# Patient Record
Sex: MALE | Race: WHITE | NOT HISPANIC OR LATINO | Employment: OTHER | URBAN - METROPOLITAN AREA
[De-identification: names, ages, dates, MRNs, and addresses within clinical notes are randomized per-mention and may not be internally consistent; named-entity substitution may affect disease eponyms.]

---

## 2020-02-28 ENCOUNTER — APPOINTMENT (OUTPATIENT)
Dept: RADIOLOGY | Facility: MEDICAL CENTER | Age: 85
DRG: 440 | End: 2020-02-28
Attending: EMERGENCY MEDICINE
Payer: MEDICARE

## 2020-02-28 ENCOUNTER — HOSPITAL ENCOUNTER (INPATIENT)
Facility: MEDICAL CENTER | Age: 85
LOS: 6 days | DRG: 440 | End: 2020-03-05
Attending: EMERGENCY MEDICINE | Admitting: HOSPITALIST
Payer: MEDICARE

## 2020-02-28 DIAGNOSIS — R10.84 GENERALIZED ABDOMINAL PAIN: ICD-10-CM

## 2020-02-28 DIAGNOSIS — R53.1 WEAKNESS: ICD-10-CM

## 2020-02-28 DIAGNOSIS — R09.02 HYPOXIA: ICD-10-CM

## 2020-02-28 DIAGNOSIS — K85.90 ACUTE PANCREATITIS, UNSPECIFIED COMPLICATION STATUS, UNSPECIFIED PANCREATITIS TYPE: ICD-10-CM

## 2020-02-28 DIAGNOSIS — K85.90 ACUTE PANCREATITIS WITHOUT INFECTION OR NECROSIS, UNSPECIFIED PANCREATITIS TYPE: ICD-10-CM

## 2020-02-28 PROBLEM — I15.9 SECONDARY HYPERTENSION: Status: ACTIVE | Noted: 2020-02-28

## 2020-02-28 PROBLEM — N28.89 RIGHT RENAL MASS: Status: ACTIVE | Noted: 2020-02-28

## 2020-02-28 PROBLEM — R73.9 HYPERGLYCEMIA: Status: ACTIVE | Noted: 2020-02-28

## 2020-02-28 PROBLEM — N20.0 NEPHROLITHIASIS: Status: ACTIVE | Noted: 2020-02-28

## 2020-02-28 LAB
ALBUMIN SERPL BCP-MCNC: 4.5 G/DL (ref 3.2–4.9)
ALBUMIN/GLOB SERPL: 1.4 G/DL
ALP SERPL-CCNC: 68 U/L (ref 30–99)
ALT SERPL-CCNC: 39 U/L (ref 2–50)
ANION GAP SERPL CALC-SCNC: 15 MMOL/L (ref 0–11.9)
AST SERPL-CCNC: 33 U/L (ref 12–45)
BASOPHILS # BLD AUTO: 0.4 % (ref 0–1.8)
BASOPHILS # BLD: 0.04 K/UL (ref 0–0.12)
BILIRUB SERPL-MCNC: 1.1 MG/DL (ref 0.1–1.5)
BUN SERPL-MCNC: 17 MG/DL (ref 8–22)
CALCIUM SERPL-MCNC: 10 MG/DL (ref 8.5–10.5)
CHLORIDE SERPL-SCNC: 104 MMOL/L (ref 96–112)
CO2 SERPL-SCNC: 19 MMOL/L (ref 20–33)
CREAT SERPL-MCNC: 1.05 MG/DL (ref 0.5–1.4)
EKG IMPRESSION: NORMAL
EOSINOPHIL # BLD AUTO: 0 K/UL (ref 0–0.51)
EOSINOPHIL NFR BLD: 0 % (ref 0–6.9)
ERYTHROCYTE [DISTWIDTH] IN BLOOD BY AUTOMATED COUNT: 47 FL (ref 35.9–50)
GLOBULIN SER CALC-MCNC: 3.3 G/DL (ref 1.9–3.5)
GLUCOSE SERPL-MCNC: 220 MG/DL (ref 65–99)
HCT VFR BLD AUTO: 50 % (ref 42–52)
HGB BLD-MCNC: 17.5 G/DL (ref 14–18)
IMM GRANULOCYTES # BLD AUTO: 0.06 K/UL (ref 0–0.11)
IMM GRANULOCYTES NFR BLD AUTO: 0.6 % (ref 0–0.9)
LACTATE BLD-SCNC: 2 MMOL/L (ref 0.5–2)
LIPASE SERPL-CCNC: 1991 U/L (ref 11–82)
LYMPHOCYTES # BLD AUTO: 0.58 K/UL (ref 1–4.8)
LYMPHOCYTES NFR BLD: 5.6 % (ref 22–41)
MCH RBC QN AUTO: 33.1 PG (ref 27–33)
MCHC RBC AUTO-ENTMCNC: 35 G/DL (ref 33.7–35.3)
MCV RBC AUTO: 94.5 FL (ref 81.4–97.8)
MONOCYTES # BLD AUTO: 0.34 K/UL (ref 0–0.85)
MONOCYTES NFR BLD AUTO: 3.3 % (ref 0–13.4)
NEUTROPHILS # BLD AUTO: 9.43 K/UL (ref 1.82–7.42)
NEUTROPHILS NFR BLD: 90.1 % (ref 44–72)
NRBC # BLD AUTO: 0 K/UL
NRBC BLD-RTO: 0 /100 WBC
PLATELET # BLD AUTO: 180 K/UL (ref 164–446)
PMV BLD AUTO: 9.9 FL (ref 9–12.9)
POTASSIUM SERPL-SCNC: 4 MMOL/L (ref 3.6–5.5)
PROT SERPL-MCNC: 7.8 G/DL (ref 6–8.2)
RBC # BLD AUTO: 5.29 M/UL (ref 4.7–6.1)
SODIUM SERPL-SCNC: 138 MMOL/L (ref 135–145)
TRIGL SERPL-MCNC: 71 MG/DL (ref 0–149)
WBC # BLD AUTO: 10.5 K/UL (ref 4.8–10.8)

## 2020-02-28 PROCEDURE — 83690 ASSAY OF LIPASE: CPT

## 2020-02-28 PROCEDURE — 83036 HEMOGLOBIN GLYCOSYLATED A1C: CPT

## 2020-02-28 PROCEDURE — 99285 EMERGENCY DEPT VISIT HI MDM: CPT

## 2020-02-28 PROCEDURE — 700105 HCHG RX REV CODE 258: Performed by: EMERGENCY MEDICINE

## 2020-02-28 PROCEDURE — 700117 HCHG RX CONTRAST REV CODE 255: Performed by: EMERGENCY MEDICINE

## 2020-02-28 PROCEDURE — 84478 ASSAY OF TRIGLYCERIDES: CPT

## 2020-02-28 PROCEDURE — 74177 CT ABD & PELVIS W/CONTRAST: CPT

## 2020-02-28 PROCEDURE — 700111 HCHG RX REV CODE 636 W/ 250 OVERRIDE (IP): Performed by: HOSPITALIST

## 2020-02-28 PROCEDURE — 700102 HCHG RX REV CODE 250 W/ 637 OVERRIDE(OP): Performed by: HOSPITALIST

## 2020-02-28 PROCEDURE — 96376 TX/PRO/DX INJ SAME DRUG ADON: CPT

## 2020-02-28 PROCEDURE — 93005 ELECTROCARDIOGRAM TRACING: CPT | Performed by: EMERGENCY MEDICINE

## 2020-02-28 PROCEDURE — 85025 COMPLETE CBC W/AUTO DIFF WBC: CPT

## 2020-02-28 PROCEDURE — 96374 THER/PROPH/DIAG INJ IV PUSH: CPT

## 2020-02-28 PROCEDURE — 770006 HCHG ROOM/CARE - MED/SURG/GYN SEMI*

## 2020-02-28 PROCEDURE — 71045 X-RAY EXAM CHEST 1 VIEW: CPT

## 2020-02-28 PROCEDURE — 93005 ELECTROCARDIOGRAM TRACING: CPT

## 2020-02-28 PROCEDURE — A9270 NON-COVERED ITEM OR SERVICE: HCPCS | Performed by: HOSPITALIST

## 2020-02-28 PROCEDURE — 96375 TX/PRO/DX INJ NEW DRUG ADDON: CPT

## 2020-02-28 PROCEDURE — 700105 HCHG RX REV CODE 258: Performed by: HOSPITALIST

## 2020-02-28 PROCEDURE — 99223 1ST HOSP IP/OBS HIGH 75: CPT | Performed by: HOSPITALIST

## 2020-02-28 PROCEDURE — 700111 HCHG RX REV CODE 636 W/ 250 OVERRIDE (IP): Performed by: EMERGENCY MEDICINE

## 2020-02-28 PROCEDURE — 80053 COMPREHEN METABOLIC PANEL: CPT

## 2020-02-28 PROCEDURE — 83605 ASSAY OF LACTIC ACID: CPT

## 2020-02-28 RX ORDER — ONDANSETRON 2 MG/ML
4 INJECTION INTRAMUSCULAR; INTRAVENOUS ONCE
Status: COMPLETED | OUTPATIENT
Start: 2020-02-28 | End: 2020-02-28

## 2020-02-28 RX ORDER — MORPHINE SULFATE 4 MG/ML
4 INJECTION, SOLUTION INTRAMUSCULAR; INTRAVENOUS ONCE
Status: COMPLETED | OUTPATIENT
Start: 2020-02-28 | End: 2020-02-28

## 2020-02-28 RX ORDER — HYDROMORPHONE HYDROCHLORIDE 1 MG/ML
1 INJECTION, SOLUTION INTRAMUSCULAR; INTRAVENOUS; SUBCUTANEOUS
Status: DISCONTINUED | OUTPATIENT
Start: 2020-02-28 | End: 2020-02-29

## 2020-02-28 RX ORDER — ATORVASTATIN CALCIUM 10 MG/1
10 TABLET, FILM COATED ORAL NIGHTLY
COMMUNITY

## 2020-02-28 RX ORDER — OXYCODONE HYDROCHLORIDE 5 MG/1
5 TABLET ORAL
Status: DISCONTINUED | OUTPATIENT
Start: 2020-02-28 | End: 2020-02-28

## 2020-02-28 RX ORDER — OXYCODONE HYDROCHLORIDE 10 MG/1
10 TABLET ORAL
Status: DISCONTINUED | OUTPATIENT
Start: 2020-02-28 | End: 2020-02-29

## 2020-02-28 RX ORDER — HYDRALAZINE HYDROCHLORIDE 20 MG/ML
10 INJECTION INTRAMUSCULAR; INTRAVENOUS EVERY 4 HOURS PRN
Status: DISCONTINUED | OUTPATIENT
Start: 2020-02-28 | End: 2020-03-05 | Stop reason: HOSPADM

## 2020-02-28 RX ORDER — POLYETHYLENE GLYCOL 3350 17 G/17G
1 POWDER, FOR SOLUTION ORAL
Status: DISCONTINUED | OUTPATIENT
Start: 2020-02-28 | End: 2020-03-05 | Stop reason: HOSPADM

## 2020-02-28 RX ORDER — LATANOPROST 50 UG/ML
1 SOLUTION/ DROPS OPHTHALMIC
Status: DISCONTINUED | OUTPATIENT
Start: 2020-02-28 | End: 2020-03-05 | Stop reason: HOSPADM

## 2020-02-28 RX ORDER — OXYCODONE HYDROCHLORIDE 10 MG/1
10 TABLET ORAL
Status: DISCONTINUED | OUTPATIENT
Start: 2020-02-28 | End: 2020-02-28

## 2020-02-28 RX ORDER — SODIUM CHLORIDE, SODIUM LACTATE, POTASSIUM CHLORIDE, CALCIUM CHLORIDE 600; 310; 30; 20 MG/100ML; MG/100ML; MG/100ML; MG/100ML
INJECTION, SOLUTION INTRAVENOUS CONTINUOUS
Status: DISCONTINUED | OUTPATIENT
Start: 2020-02-28 | End: 2020-03-04

## 2020-02-28 RX ORDER — HYDROMORPHONE HYDROCHLORIDE 1 MG/ML
0.5 INJECTION, SOLUTION INTRAMUSCULAR; INTRAVENOUS; SUBCUTANEOUS ONCE
Status: COMPLETED | OUTPATIENT
Start: 2020-02-28 | End: 2020-02-28

## 2020-02-28 RX ORDER — HYDROMORPHONE HYDROCHLORIDE 1 MG/ML
0.5 INJECTION, SOLUTION INTRAMUSCULAR; INTRAVENOUS; SUBCUTANEOUS
Status: DISCONTINUED | OUTPATIENT
Start: 2020-02-28 | End: 2020-02-28

## 2020-02-28 RX ORDER — ENALAPRILAT 1.25 MG/ML
1.25 INJECTION INTRAVENOUS EVERY 6 HOURS PRN
Status: DISCONTINUED | OUTPATIENT
Start: 2020-02-28 | End: 2020-03-05 | Stop reason: HOSPADM

## 2020-02-28 RX ORDER — OXYCODONE HYDROCHLORIDE 5 MG/1
5 TABLET ORAL
Status: DISCONTINUED | OUTPATIENT
Start: 2020-02-28 | End: 2020-02-29

## 2020-02-28 RX ORDER — ENALAPRILAT 1.25 MG/ML
1.25 INJECTION INTRAVENOUS EVERY 6 HOURS PRN
Status: DISCONTINUED | OUTPATIENT
Start: 2020-02-28 | End: 2020-02-28

## 2020-02-28 RX ORDER — SODIUM CHLORIDE 9 MG/ML
1000 INJECTION, SOLUTION INTRAVENOUS ONCE
Status: COMPLETED | OUTPATIENT
Start: 2020-02-28 | End: 2020-02-28

## 2020-02-28 RX ORDER — BISACODYL 10 MG
10 SUPPOSITORY, RECTAL RECTAL
Status: DISCONTINUED | OUTPATIENT
Start: 2020-02-28 | End: 2020-03-05 | Stop reason: HOSPADM

## 2020-02-28 RX ORDER — PAROXETINE 10 MG/1
10 TABLET, FILM COATED ORAL DAILY
COMMUNITY

## 2020-02-28 RX ORDER — AMOXICILLIN 250 MG
2 CAPSULE ORAL 2 TIMES DAILY
Status: DISCONTINUED | OUTPATIENT
Start: 2020-02-28 | End: 2020-03-05 | Stop reason: HOSPADM

## 2020-02-28 RX ADMIN — IOHEXOL 100 ML: 350 INJECTION, SOLUTION INTRAVENOUS at 10:59

## 2020-02-28 RX ADMIN — MORPHINE SULFATE 4 MG: 4 INJECTION INTRAVENOUS at 09:55

## 2020-02-28 RX ADMIN — ONDANSETRON 4 MG: 2 INJECTION INTRAMUSCULAR; INTRAVENOUS at 11:48

## 2020-02-28 RX ADMIN — HYDRALAZINE HYDROCHLORIDE 10 MG: 20 INJECTION INTRAMUSCULAR; INTRAVENOUS at 17:01

## 2020-02-28 RX ADMIN — OXYCODONE HYDROCHLORIDE 10 MG: 10 TABLET ORAL at 16:10

## 2020-02-28 RX ADMIN — SODIUM CHLORIDE 1000 ML: 9 INJECTION, SOLUTION INTRAVENOUS at 09:55

## 2020-02-28 RX ADMIN — HYDROMORPHONE HYDROCHLORIDE 1 MG: 1 INJECTION, SOLUTION INTRAMUSCULAR; INTRAVENOUS; SUBCUTANEOUS at 19:46

## 2020-02-28 RX ADMIN — SODIUM CHLORIDE, POTASSIUM CHLORIDE, SODIUM LACTATE AND CALCIUM CHLORIDE: 600; 310; 30; 20 INJECTION, SOLUTION INTRAVENOUS at 14:58

## 2020-02-28 RX ADMIN — HYDROMORPHONE HYDROCHLORIDE 1 MG: 1 INJECTION, SOLUTION INTRAMUSCULAR; INTRAVENOUS; SUBCUTANEOUS at 22:42

## 2020-02-28 RX ADMIN — SODIUM CHLORIDE, POTASSIUM CHLORIDE, SODIUM LACTATE AND CALCIUM CHLORIDE: 600; 310; 30; 20 INJECTION, SOLUTION INTRAVENOUS at 21:45

## 2020-02-28 RX ADMIN — HYDROMORPHONE HYDROCHLORIDE 0.5 MG: 1 INJECTION, SOLUTION INTRAMUSCULAR; INTRAVENOUS; SUBCUTANEOUS at 14:27

## 2020-02-28 RX ADMIN — LATANOPROST 1 DROP: 50 SOLUTION OPHTHALMIC at 21:40

## 2020-02-28 RX ADMIN — HYDROMORPHONE HYDROCHLORIDE 0.5 MG: 1 INJECTION, SOLUTION INTRAMUSCULAR; INTRAVENOUS; SUBCUTANEOUS at 11:35

## 2020-02-28 RX ADMIN — ONDANSETRON 4 MG: 2 INJECTION INTRAMUSCULAR; INTRAVENOUS at 09:55

## 2020-02-28 RX ADMIN — HYDROMORPHONE HYDROCHLORIDE 1 MG: 1 INJECTION, SOLUTION INTRAMUSCULAR; INTRAVENOUS; SUBCUTANEOUS at 17:01

## 2020-02-28 RX ADMIN — ENALAPRILAT 1.25 MG: 1.25 INJECTION INTRAVENOUS at 16:10

## 2020-02-28 ASSESSMENT — ENCOUNTER SYMPTOMS
FEVER: 0
SHORTNESS OF BREATH: 0
NERVOUS/ANXIOUS: 0
DEPRESSION: 0
ABDOMINAL PAIN: 1
NAUSEA: 1
CONSTIPATION: 0
DIZZINESS: 0
BLOOD IN STOOL: 0
STRIDOR: 0
VOMITING: 1
DIARRHEA: 0
COUGH: 0
PALPITATIONS: 0
CHILLS: 0
EYE DISCHARGE: 0
WEIGHT LOSS: 0
BACK PAIN: 0
HEADACHES: 0

## 2020-02-28 ASSESSMENT — LIFESTYLE VARIABLES
CONSUMPTION TOTAL: NEGATIVE
EVER FELT BAD OR GUILTY ABOUT YOUR DRINKING: NO
HOW MANY TIMES IN THE PAST YEAR HAVE YOU HAD 5 OR MORE DRINKS IN A DAY: 0
ON A TYPICAL DAY WHEN YOU DRINK ALCOHOL HOW MANY DRINKS DO YOU HAVE: 1
EVER HAD A DRINK FIRST THING IN THE MORNING TO STEADY YOUR NERVES TO GET RID OF A HANGOVER: NO
AVERAGE NUMBER OF DAYS PER WEEK YOU HAVE A DRINK CONTAINING ALCOHOL: 5
TOTAL SCORE: 0
EVER_SMOKED: YES
HAVE PEOPLE ANNOYED YOU BY CRITICIZING YOUR DRINKING: NO
TOTAL SCORE: 0
TOTAL SCORE: 0
DOES PATIENT WANT TO STOP DRINKING: NO
ALCOHOL_USE: YES
HAVE YOU EVER FELT YOU SHOULD CUT DOWN ON YOUR DRINKING: NO

## 2020-02-28 ASSESSMENT — COGNITIVE AND FUNCTIONAL STATUS - GENERAL
MOVING FROM LYING ON BACK TO SITTING ON SIDE OF FLAT BED: A LITTLE
DAILY ACTIVITIY SCORE: 18
DRESSING REGULAR LOWER BODY CLOTHING: A LITTLE
MOBILITY SCORE: 18
CLIMB 3 TO 5 STEPS WITH RAILING: A LITTLE
HELP NEEDED FOR BATHING: A LITTLE
SUGGESTED CMS G CODE MODIFIER MOBILITY: CK
TURNING FROM BACK TO SIDE WHILE IN FLAT BAD: A LITTLE
PERSONAL GROOMING: A LITTLE
MOVING TO AND FROM BED TO CHAIR: A LITTLE
DRESSING REGULAR UPPER BODY CLOTHING: A LITTLE
TOILETING: A LITTLE
EATING MEALS: A LITTLE
SUGGESTED CMS G CODE MODIFIER DAILY ACTIVITY: CK
WALKING IN HOSPITAL ROOM: A LITTLE
STANDING UP FROM CHAIR USING ARMS: A LITTLE

## 2020-02-28 ASSESSMENT — PATIENT HEALTH QUESTIONNAIRE - PHQ9
1. LITTLE INTEREST OR PLEASURE IN DOING THINGS: NOT AT ALL
2. FEELING DOWN, DEPRESSED, IRRITABLE, OR HOPELESS: NOT AT ALL
SUM OF ALL RESPONSES TO PHQ9 QUESTIONS 1 AND 2: 0

## 2020-02-28 NOTE — ED TRIAGE NOTES
Pt  to triage with   Chief Complaint   Patient presents with   • Abdominal Pain     started at 1 am, started epigastric and now spreading through abd, N/V reported, sharp pain, sudden onset.     Pt Informed regarding triage process and verbalized understanding to inform triage tech or RN for any changes in condition. Placed in lobby.

## 2020-02-28 NOTE — H&P
Hospital Medicine History & Physical Note    Date of Service  2020    Primary Care Physician  Rosemary Mckeon M.D.    Consultants  none    Code Status  FULL    Chief Complaint  Abdominal pain at 1 am.    History of Presenting Illness  85 y.o. male who presented 2020 with acute abdominal pain that initiated at 0100.  He had last had a meal at 1900 yesterday and no food since that time.  He has had acute pancreatitis 3 years ago which may have been due to drinking too many beers at that time.  He had complications during his hospitalization and since that time has not been drinking any alcohol.  He denies any new medications, recent viral infections, nor alcohol.  He does not have a history of hypertriglyceridemia.      Review of Systems  Review of Systems   Constitutional: Negative for chills, fever and weight loss.   Eyes: Negative for discharge.   Respiratory: Negative for cough, shortness of breath and stridor.    Cardiovascular: Negative for chest pain, palpitations and leg swelling.   Gastrointestinal: Positive for abdominal pain, nausea and vomiting. Negative for blood in stool, constipation, diarrhea and melena.   Genitourinary: Negative for dysuria and hematuria.   Musculoskeletal: Negative for back pain and joint pain.   Skin: Negative for rash.   Neurological: Negative for dizziness and headaches.   Psychiatric/Behavioral: Negative for depression. The patient is not nervous/anxious.        Past Medical History   has a past medical history of Arthritis, CATARACT, Hypertension, and Pain.    Surgical History   has a past surgical history that includes myringotomy; tonsillectomy and adenoidectomy; hemorrhoidectomy; lumbar laminectomy diskectomy (7/15/2010); cataract phaco with iol (3/2/2011); cataract phaco with iol (3/16/2011); and inj,epidural/lumb/sac single (2013).     Family History  Father  in his early 90s with brain cancer  Mother  at age 101 from heart attack      Social History    reports that he has quit smoking. He has never used smokeless tobacco. He reports that he does not use drugs.  Patient is a retired anesthesiologist.  He has 3 children.  He is .  Denies any current alcohol use    Allergies  Allergies   Allergen Reactions   • Hydrodiuril [Hctz] Itching   • Lidocaine    • Sulfa Drugs        Medications  Prior to Admission Medications   Prescriptions Last Dose Informant Patient Reported? Taking?   Bimatoprost (LUMIGAN) 0.01 % SOLN 2020 at PM Patient Yes No   Si Drop by Ophthalmic route every bedtime. To both eyes    PARoxetine (PAXIL) 10 MG Tab 2020 at PM Patient Yes Yes   Sig: Take 10 mg by mouth every day.   atorvastatin (LIPITOR) 10 MG Tab 2020 at PM Patient Yes Yes   Sig: Take 10 mg by mouth every evening.      Facility-Administered Medications: None       Physical Exam  Temp:  [36.4 °C (97.5 °F)-37.1 °C (98.8 °F)] 37.1 °C (98.8 °F)  Pulse:  [] 102  Resp:  [18-22] 18  BP: (169-187)/() 172/83  SpO2:  [92 %-95 %] 93 %    Physical Exam  Vitals signs reviewed.   Constitutional:       Appearance: Normal appearance. He is not diaphoretic.   HENT:      Head: Normocephalic and atraumatic.      Nose: Nose normal.      Mouth/Throat:      Mouth: Mucous membranes are moist.      Pharynx: No oropharyngeal exudate.   Eyes:      General: No scleral icterus.        Right eye: No discharge.         Left eye: No discharge.      Extraocular Movements: Extraocular movements intact.      Conjunctiva/sclera: Conjunctivae normal.      Pupils: Pupils are equal, round, and reactive to light.   Neck:      Musculoskeletal: No muscular tenderness.      Vascular: No carotid bruit.   Cardiovascular:      Rate and Rhythm: Normal rate and regular rhythm.      Pulses:           Radial pulses are 2+ on the right side and 2+ on the left side.        Dorsalis pedis pulses are 2+ on the right side and 2+ on the left side.      Heart sounds: Normal heart sounds. No murmur.    Pulmonary:      Effort: Pulmonary effort is normal. No respiratory distress.      Breath sounds: Normal breath sounds. No wheezing or rales.   Abdominal:      General: Bowel sounds are decreased. There is no distension.      Palpations: Abdomen is soft.      Tenderness: There is abdominal tenderness.   Musculoskeletal:         General: No swelling or tenderness.   Lymphadenopathy:      Cervical: No cervical adenopathy.   Skin:     Coloration: Skin is not jaundiced or pale.   Neurological:      General: No focal deficit present.      Mental Status: He is alert and oriented to person, place, and time. Mental status is at baseline.      Cranial Nerves: No cranial nerve deficit.   Psychiatric:         Mood and Affect: Mood normal.         Behavior: Behavior normal.         Laboratory:  Recent Labs     02/28/20  0924   WBC 10.5   RBC 5.29   HEMOGLOBIN 17.5   HEMATOCRIT 50.0   MCV 94.5   MCH 33.1*   MCHC 35.0   RDW 47.0   PLATELETCT 180   MPV 9.9     Recent Labs     02/28/20  0924   SODIUM 138   POTASSIUM 4.0   CHLORIDE 104   CO2 19*   GLUCOSE 220*   BUN 17   CREATININE 1.05   CALCIUM 10.0     Recent Labs     02/28/20  0924   ALTSGPT 39   ASTSGOT 33   ALKPHOSPHAT 68   TBILIRUBIN 1.1   LIPASE 1991*   GLUCOSE 220*         No results for input(s): NTPROBNP in the last 72 hours.      No results for input(s): TROPONINT in the last 72 hours.    Urinalysis:    No results found     Imaging:  CT-ABDOMEN-PELVIS WITH   Final Result      Significant inflammatory changes about the pancreatic head and body. Findings compatible with pancreatitis. Inflammation extends above the duodenum as well.      Pancreatic calcifications likely sequelae of chronic pancreatitis.      Atherosclerotic plaque.      Indeterminate 1.1 cm right renal lesion may represent a hemorrhagic/proteinaceous cyst but a solid lesion is not excluded. Further evaluation can be performed with renal protocol MRI.      Small hypodense renal lesions bilaterally are too  small to characterize.      Bilateral nonobstructing renal calculi.               DX-CHEST-PORTABLE (1 VIEW)   Final Result      Bibasilar opacities may represent atelectasis or pneumonitis.      Atherosclerotic plaque.            Assessment/Plan:  I anticipate this patient will require at least two midnights for appropriate medical management, necessitating inpatient admission.    * Acute pancreatitis  Assessment & Plan  Aggressive IV fluids 150 cc an hour  Follow-up CBC in the morning  Dilaudid 1 mg every hour IV as well as Roxicodone as needed  N.p.o. for now  CT shows chronic pancreatitis findings.  Patient has been alcohol free x3 years but has never been a big drinker.  No recent illnesses to suggest viral etiology.  He has not had any new medications.    Secondary hypertension  Assessment & Plan  Monitor vitals  Enalapril 1.25 mg IV every 6 hours and hydralazine 10 mg IV every 4 hours as needed  Control pain    Right renal mass  Assessment & Plan  I have recommended the patient follow-up with his urologist Dr. Hiren Evans  I have alerted the patient and his wife the CT findings showing Indeterminate 1.1 cm right renal lesion may represent a hemorrhagic/proteinaceous cyst but a solid lesion is not excluded. Further evaluation can be performed with renal protocol MRI.  Small hypodense renal lesions bilaterally are too small to characterizeIndeterminate     Hyperglycemia  Assessment & Plan  Check a Glycohemoglobin  Monitor labs.    Nephrolithiasis  Assessment & Plan  Noted on CT abdomen/pelvis nonobstructive.  IV fluids      VTE prophylaxis: SCDs

## 2020-02-28 NOTE — ED PROVIDER NOTES
ED Provider Note    CHIEF COMPLAINT  Chief Complaint   Patient presents with   • Abdominal Pain     started at 1 am, started epigastric and now spreading through abd, N/V reported, sharp pain, sudden onset.       HPI  Mary Castillo is a 85 y.o. male who presents to the emergency department complaining of abdominal pain.  Patient had abrupt onset of abdominal pain about 1 AM.  Primarily epigastric area does not radiate.  Pain is moderate to severe.  Nothing makes it better nothing makes it worse.  Associated nausea and vomiting.  Pain is sharp and severe.  No tearing.  To the back.  No chest pain or shortness of breath.  Denies any other aggravating leaving factors associated complaints.  Nematodes melena hematochezia.  He has previous history of pancreatitis feels somewhat similar.  No history of peptic ulcer disease.    REVIEW OF SYSTEMS  See HPI for further details. All other systems are negative.    PAST MEDICAL HISTORY  Past Medical History:   Diagnosis Date   • Arthritis     spinal stenosis   • CATARACT    • Hypertension    • Pain        FAMILY HISTORY  History reviewed. No pertinent family history.    SOCIAL HISTORY  Social History     Socioeconomic History   • Marital status:      Spouse name: Not on file   • Number of children: Not on file   • Years of education: Not on file   • Highest education level: Not on file   Occupational History   • Not on file   Social Needs   • Financial resource strain: Not on file   • Food insecurity     Worry: Not on file     Inability: Not on file   • Transportation needs     Medical: Not on file     Non-medical: Not on file   Tobacco Use   • Smoking status: Former Smoker   • Smokeless tobacco: Never Used   • Tobacco comment: 2 ppd, 2 yrs   Substance and Sexual Activity   • Alcohol use: Yes     Comment: 2 per day   • Drug use: No   • Sexual activity: Not on file   Lifestyle   • Physical activity     Days per week: Not on file     Minutes per session: Not on file  "  • Stress: Not on file   Relationships   • Social connections     Talks on phone: Not on file     Gets together: Not on file     Attends Jewish service: Not on file     Active member of club or organization: Not on file     Attends meetings of clubs or organizations: Not on file     Relationship status: Not on file   • Intimate partner violence     Fear of current or ex partner: Not on file     Emotionally abused: Not on file     Physically abused: Not on file     Forced sexual activity: Not on file   Other Topics Concern   • Not on file   Social History Narrative   • Not on file       SURGICAL HISTORY  Past Surgical History:   Procedure Laterality Date   • INJ,EPIDURAL/LUMB/SAC SINGLE  2/1/2013    Performed by Marcial Caballero D.O. at [facility]   • CATARACT PHACO WITH IOL  3/16/2011    Performed by ARI FORTE at [facility]   • CATARACT PHACO WITH IOL  3/2/2011    Performed by ARI FORTE at [facility]   • LUMBAR LAMINECTOMY DISKECTOMY  7/15/2010    Performed by MARIE WYNN at [facility]   • HEMORRHOIDECTOMY     • MYRINGOTOMY     • TONSILLECTOMY AND ADENOIDECTOMY         CURRENT MEDICATIONS  Home Medications     Reviewed by Aga Cage R.N. (Registered Nurse) on 02/28/20 at 0851  Med List Status: Partial   Medication Last Dose Status   Bimatoprost (LUMIGAN) 0.01 % SOLN  Active   Naproxen Sodium (ALEVE) 220 MG CAPS  Active   telmisartan (MICARDIS) 80 MG TABS  Active                ALLERGIES  Allergies   Allergen Reactions   • Hydrodiuril [Hctz] Itching   • Lidocaine    • Sulfa Drugs        PHYSICAL EXAM  VITAL SIGNS: BP (!) 180/99   Pulse 72   Temp 36.4 °C (97.5 °F) (Oral)   Resp (!) 22   Ht 1.803 m (5' 11\")   Wt 90.7 kg (200 lb)   SpO2 94%   BMI 27.89 kg/m²    Constitutional: Awake alert appears uncomfortable no acute cardiopulmonary stress.  HENT: Normocephalic, Atraumatic, Bilateral external ears normal, " Oropharynx moist, No oral exudates, Nose normal.   Eyes: PERRL, EOMI, Conjunctiva normal, No discharge.   Neck: Normal range of motion, No tenderness, Supple, No stridor.    Cardiovascular: Normal heart rate, Normal rhythm, No murmurs, No rubs, No gallops.   Thorax & Lungs: Normal breath sounds, No respiratory distress, No wheezing, No chest tenderness.   Abdomen: Distended, firm, diffuse tenderness concern for peritonitis.  Hypoactive bowel sounds.  Skin: Warm, Dry, No erythema, No rash.   Musculoskeletal: Good range of motion in all major joints.   Neurologic: Alert, No focal deficits noted.   Psychiatric: Affect normal    Results for orders placed or performed during the hospital encounter of 02/28/20   CBC WITH DIFFERENTIAL   Result Value Ref Range    WBC 10.5 4.8 - 10.8 K/uL    RBC 5.29 4.70 - 6.10 M/uL    Hemoglobin 17.5 14.0 - 18.0 g/dL    Hematocrit 50.0 42.0 - 52.0 %    MCV 94.5 81.4 - 97.8 fL    MCH 33.1 (H) 27.0 - 33.0 pg    MCHC 35.0 33.7 - 35.3 g/dL    RDW 47.0 35.9 - 50.0 fL    Platelet Count 180 164 - 446 K/uL    MPV 9.9 9.0 - 12.9 fL    Neutrophils-Polys 90.10 (H) 44.00 - 72.00 %    Lymphocytes 5.60 (L) 22.00 - 41.00 %    Monocytes 3.30 0.00 - 13.40 %    Eosinophils 0.00 0.00 - 6.90 %    Basophils 0.40 0.00 - 1.80 %    Immature Granulocytes 0.60 0.00 - 0.90 %    Nucleated RBC 0.00 /100 WBC    Neutrophils (Absolute) 9.43 (H) 1.82 - 7.42 K/uL    Lymphs (Absolute) 0.58 (L) 1.00 - 4.80 K/uL    Monos (Absolute) 0.34 0.00 - 0.85 K/uL    Eos (Absolute) 0.00 0.00 - 0.51 K/uL    Baso (Absolute) 0.04 0.00 - 0.12 K/uL    Immature Granulocytes (abs) 0.06 0.00 - 0.11 K/uL    NRBC (Absolute) 0.00 K/uL   COMP METABOLIC PANEL   Result Value Ref Range    Sodium 138 135 - 145 mmol/L    Potassium 4.0 3.6 - 5.5 mmol/L    Chloride 104 96 - 112 mmol/L    Co2 19 (L) 20 - 33 mmol/L    Anion Gap 15.0 (H) 0.0 - 11.9    Glucose 220 (H) 65 - 99 mg/dL    Bun 17 8 - 22 mg/dL    Creatinine 1.05 0.50 - 1.40 mg/dL    Calcium 10.0  8.5 - 10.5 mg/dL    AST(SGOT) 33 12 - 45 U/L    ALT(SGPT) 39 2 - 50 U/L    Alkaline Phosphatase 68 30 - 99 U/L    Total Bilirubin 1.1 0.1 - 1.5 mg/dL    Albumin 4.5 3.2 - 4.9 g/dL    Total Protein 7.8 6.0 - 8.2 g/dL    Globulin 3.3 1.9 - 3.5 g/dL    A-G Ratio 1.4 g/dL   LIPASE   Result Value Ref Range    Lipase  (H) 11 - 82 U/L   ESTIMATED GFR   Result Value Ref Range    GFR If African American >60 >60 mL/min/1.73 m 2    GFR If Non African American >60 >60 mL/min/1.73 m 2   LACTIC ACID   Result Value Ref Range    Lactic Acid 2.0 0.5 - 2.0 mmol/L   EKG   Result Value Ref Range    Report       [facility] Emergency Dept.    Test Date:  2020  Pt Name:    CLEMENTE ZAPATA                   Department: ER  MRN:        [id number]                      Room:  Gender:     Male                         Technician: [id number]  :        1934                   Requested By:ER TRIAGE PROTOCOL  Order #:    [id number]                    Reading MD: MARILYN CHRISTIE. [facility]    Measurements  Intervals                                Axis  Rate:       68                           P:          0  OR:         208                          QRS:        28  QRSD:       86                           T:          25  QT:         424  QTc:        451    Interpretive Statements  SINUS RHYTHM  Compared to ECG 2010 10:01:50  Sinus bradycardia no longer present  Electronically Signed On 2020 11:14:07 PST by MARILYN CHRISTIE. [facility]        RADIOLOGY/PROCEDURES  CT-ABDOMEN-PELVIS WITH   Final Result      Significant inflammatory changes about the pancreatic head and body. Findings compatible with pancreatitis. Inflammation extends above the duodenum as well.      Pancreatic calcifications likely sequelae of chronic pancreatitis.      Atherosclerotic plaque.      Indeterminate 1.1 cm right renal lesion may represent a hemorrhagic/proteinaceous cyst but a solid lesion is not excluded. Further evaluation can be performed with renal  protocol MRI.      Small hypodense renal lesions bilaterally are too small to characterize.      Bilateral nonobstructing renal calculi.               DX-CHEST-PORTABLE (1 VIEW)   Final Result      Bibasilar opacities may represent atelectasis or pneumonitis.      Atherosclerotic plaque.            COURSE & MEDICAL DECISION MAKING  Pertinent Labs & Imaging studies reviewed. (See chart for details)      Patient presents emerged part with acute abdominal pain.  Broad official diagnosis was considered including but not limited to acute pancreatitis, perforated peptic ulcer perforated viscus, volvulus, bowel obstruction or other acute surgical process.    IV is established patient started on IV fluids.  Given morphine and Zofran.  Is worked up for the above diagnosis labs, cardiogram, and a CT scan.    Chest x-ray does not show any free air under the diaphragm.  No immediate evidence of perforation.  Labs are reassuring except for an elevated lipase consistent with acute pancreatitis.    The patient is reassessed after pain medicine is improving.  He is receiving IV fluids because we may not have any oral fluids.    He was remain n.p.o. is treated for pancreatitis he is requiring fluid resuscitation nausea vomiting and decreased oral intake.  He is reassessed after the fluids and is improved.    Patient requires hospitalization for ongoing work-up and treatment.  Care is transferred to the hospitalist Dr. Abbott.  Is been hospitalized in guarded condition    Lactic acid is not particularly elevated.  No signs of bowel ischemia.      FINAL IMPRESSION  1. Generalized abdominal pain     2. Acute pancreatitis, unspecified complication status, unspecified pancreatitis type         3.         Electronically signed by: Marcus Jeffries M.D., 2/28/2020 10:00 AM

## 2020-02-29 LAB
ANION GAP SERPL CALC-SCNC: 11 MMOL/L (ref 0–11.9)
BASOPHILS # BLD AUTO: 0.2 % (ref 0–1.8)
BASOPHILS # BLD: 0.03 K/UL (ref 0–0.12)
BUN SERPL-MCNC: 16 MG/DL (ref 8–22)
CALCIUM SERPL-MCNC: 8.6 MG/DL (ref 8.5–10.5)
CHLORIDE SERPL-SCNC: 106 MMOL/L (ref 96–112)
CO2 SERPL-SCNC: 21 MMOL/L (ref 20–33)
CREAT SERPL-MCNC: 0.91 MG/DL (ref 0.5–1.4)
EOSINOPHIL # BLD AUTO: 0 K/UL (ref 0–0.51)
EOSINOPHIL NFR BLD: 0 % (ref 0–6.9)
ERYTHROCYTE [DISTWIDTH] IN BLOOD BY AUTOMATED COUNT: 49.4 FL (ref 35.9–50)
EST. AVERAGE GLUCOSE BLD GHB EST-MCNC: 126 MG/DL
GLUCOSE SERPL-MCNC: 180 MG/DL (ref 65–99)
HBA1C MFR BLD: 6 % (ref 0–5.6)
HCT VFR BLD AUTO: 49.2 % (ref 42–52)
HGB BLD-MCNC: 16.8 G/DL (ref 14–18)
IMM GRANULOCYTES # BLD AUTO: 0.09 K/UL (ref 0–0.11)
IMM GRANULOCYTES NFR BLD AUTO: 0.7 % (ref 0–0.9)
LYMPHOCYTES # BLD AUTO: 0.61 K/UL (ref 1–4.8)
LYMPHOCYTES NFR BLD: 4.5 % (ref 22–41)
MCH RBC QN AUTO: 32.7 PG (ref 27–33)
MCHC RBC AUTO-ENTMCNC: 34.1 G/DL (ref 33.7–35.3)
MCV RBC AUTO: 95.9 FL (ref 81.4–97.8)
MONOCYTES # BLD AUTO: 0.89 K/UL (ref 0–0.85)
MONOCYTES NFR BLD AUTO: 6.6 % (ref 0–13.4)
NEUTROPHILS # BLD AUTO: 11.94 K/UL (ref 1.82–7.42)
NEUTROPHILS NFR BLD: 88 % (ref 44–72)
NRBC # BLD AUTO: 0 K/UL
NRBC BLD-RTO: 0 /100 WBC
PLATELET # BLD AUTO: 173 K/UL (ref 164–446)
PMV BLD AUTO: 9.8 FL (ref 9–12.9)
POTASSIUM SERPL-SCNC: 3.9 MMOL/L (ref 3.6–5.5)
RBC # BLD AUTO: 5.13 M/UL (ref 4.7–6.1)
SODIUM SERPL-SCNC: 138 MMOL/L (ref 135–145)
WBC # BLD AUTO: 13.6 K/UL (ref 4.8–10.8)

## 2020-02-29 PROCEDURE — 85025 COMPLETE CBC W/AUTO DIFF WBC: CPT

## 2020-02-29 PROCEDURE — 700111 HCHG RX REV CODE 636 W/ 250 OVERRIDE (IP): Performed by: HOSPITALIST

## 2020-02-29 PROCEDURE — 80048 BASIC METABOLIC PNL TOTAL CA: CPT

## 2020-02-29 PROCEDURE — 99232 SBSQ HOSP IP/OBS MODERATE 35: CPT | Performed by: HOSPITALIST

## 2020-02-29 PROCEDURE — 700105 HCHG RX REV CODE 258: Performed by: HOSPITALIST

## 2020-02-29 PROCEDURE — 36415 COLL VENOUS BLD VENIPUNCTURE: CPT

## 2020-02-29 PROCEDURE — 770006 HCHG ROOM/CARE - MED/SURG/GYN SEMI*

## 2020-02-29 RX ORDER — OXYCODONE HYDROCHLORIDE 5 MG/1
5 TABLET ORAL
Status: DISCONTINUED | OUTPATIENT
Start: 2020-02-29 | End: 2020-03-01

## 2020-02-29 RX ORDER — OXYCODONE HYDROCHLORIDE 10 MG/1
10 TABLET ORAL
Status: DISCONTINUED | OUTPATIENT
Start: 2020-02-29 | End: 2020-03-01

## 2020-02-29 RX ORDER — HYDROMORPHONE HYDROCHLORIDE 1 MG/ML
1 INJECTION, SOLUTION INTRAMUSCULAR; INTRAVENOUS; SUBCUTANEOUS
Status: DISCONTINUED | OUTPATIENT
Start: 2020-02-29 | End: 2020-03-01

## 2020-02-29 RX ADMIN — HYDROMORPHONE HYDROCHLORIDE 1 MG: 1 INJECTION, SOLUTION INTRAMUSCULAR; INTRAVENOUS; SUBCUTANEOUS at 08:43

## 2020-02-29 RX ADMIN — HYDROMORPHONE HYDROCHLORIDE 1 MG: 1 INJECTION, SOLUTION INTRAMUSCULAR; INTRAVENOUS; SUBCUTANEOUS at 20:01

## 2020-02-29 RX ADMIN — HYDROMORPHONE HYDROCHLORIDE 1 MG: 1 INJECTION, SOLUTION INTRAMUSCULAR; INTRAVENOUS; SUBCUTANEOUS at 14:00

## 2020-02-29 RX ADMIN — SODIUM CHLORIDE, POTASSIUM CHLORIDE, SODIUM LACTATE AND CALCIUM CHLORIDE: 600; 310; 30; 20 INJECTION, SOLUTION INTRAVENOUS at 04:41

## 2020-02-29 RX ADMIN — SODIUM CHLORIDE, POTASSIUM CHLORIDE, SODIUM LACTATE AND CALCIUM CHLORIDE: 600; 310; 30; 20 INJECTION, SOLUTION INTRAVENOUS at 11:32

## 2020-02-29 RX ADMIN — HYDROMORPHONE HYDROCHLORIDE 1 MG: 1 INJECTION, SOLUTION INTRAMUSCULAR; INTRAVENOUS; SUBCUTANEOUS at 05:48

## 2020-02-29 RX ADMIN — HYDROMORPHONE HYDROCHLORIDE 1 MG: 1 INJECTION, SOLUTION INTRAMUSCULAR; INTRAVENOUS; SUBCUTANEOUS at 17:40

## 2020-02-29 RX ADMIN — HYDROMORPHONE HYDROCHLORIDE 1 MG: 1 INJECTION, SOLUTION INTRAMUSCULAR; INTRAVENOUS; SUBCUTANEOUS at 11:31

## 2020-02-29 RX ADMIN — SODIUM CHLORIDE, POTASSIUM CHLORIDE, SODIUM LACTATE AND CALCIUM CHLORIDE: 600; 310; 30; 20 INJECTION, SOLUTION INTRAVENOUS at 17:45

## 2020-02-29 RX ADMIN — HYDROMORPHONE HYDROCHLORIDE 1 MG: 1 INJECTION, SOLUTION INTRAMUSCULAR; INTRAVENOUS; SUBCUTANEOUS at 23:48

## 2020-02-29 RX ADMIN — HYDROMORPHONE HYDROCHLORIDE 1 MG: 1 INJECTION, SOLUTION INTRAMUSCULAR; INTRAVENOUS; SUBCUTANEOUS at 02:16

## 2020-02-29 RX ADMIN — LATANOPROST 1 DROP: 50 SOLUTION OPHTHALMIC at 20:01

## 2020-02-29 ASSESSMENT — ENCOUNTER SYMPTOMS
SHORTNESS OF BREATH: 0
FEVER: 0
FALLS: 0
NERVOUS/ANXIOUS: 1
PALPITATIONS: 0
ABDOMINAL PAIN: 1
FOCAL WEAKNESS: 0
NAUSEA: 1
HEADACHES: 0
DIZZINESS: 0
LOSS OF CONSCIOUSNESS: 0
BLOOD IN STOOL: 0
CHILLS: 0
VOMITING: 0

## 2020-02-29 ASSESSMENT — FIBROSIS 4 INDEX: FIB4 SCORE: 2.6

## 2020-02-29 NOTE — ASSESSMENT & PLAN NOTE
Etiology of recurrence is unclear. TAG not elevated, denies drinking x3 years. CT showed evidence of chronic pancreatitis. Lipase elevate at 1991--> 232.   No gallbladder abnormalities on CT  Pain is improving, will advance diet to soft today  Needs to ambulate, passing gas and having bm

## 2020-02-29 NOTE — ASSESSMENT & PLAN NOTE
"Was discussed on admission by admitting hospitalist. CT showed \"Indeterminate 1.1 cm right renal lesion may represent a hemorrhagic/proteinaceous cyst but a solid lesion is not excluded. Further evaluation can be performed with renal protocol MRI. Small hypodense renal lesions bilaterally are too small to characterize\".  Per previous hospitalist patient will follow-up as outpatient  "

## 2020-02-29 NOTE — PROGRESS NOTES
Pt is currently in bed. C/o abdominal pain, medicated per MAR. Pain managed by current pain management plan.  on. Pt's wife requested private room d/t pt's nausea induced by roommate's family bringing food in, notified charge. Room unavailable. Pt stated it is fine, he does not need private room. Call light within reach, pt room by nurses' station.

## 2020-02-29 NOTE — PROGRESS NOTES
2 RN skin check completed with NUBIA Richardson.   Devices in place: right forearm PIV, nasal cannula with gray foam on ears, continuous pulse oximetry monitoring, glasses.  Skin assessed under devices: yes.  Confirmed pressure ulcers found on: N/A.  New potential pressure ulcers noted on: N/A. Wound consult placed: N/A.  The following interventions in place: patient can turn self in bed, pillows for positioning, routine skin assessments and Jose scale.    Skin assessment:  -bilateral heels pink and blanching, intact  -tip of right great toe red and blanching, intact  -left great toe nail discolored, not a new finding per patient  -bilateral elbows pink and blanching, intact  -behind bilateral ears pink and blanching, intact

## 2020-02-29 NOTE — ASSESSMENT & PLAN NOTE
Noted on CT abdomen/pelvis; nonobstructive.  - IV fluids  - outpatient follow up as needed.  Continue monitoring

## 2020-02-29 NOTE — CARE PLAN
Problem: Safety  Goal: Will remain free from falls  Outcome: PROGRESSING AS EXPECTED  Note: Bed in lowest and locked position. Call light and belongings within reach. Room by nurses' station.       Problem: Pain Management  Goal: Pain level will decrease to patient's comfort goal  Outcome: PROGRESSING AS EXPECTED  Note: C/o abdominal pain. Assessing pt as needed. Pain managed by current pain management plan.

## 2020-02-29 NOTE — PROGRESS NOTES
[facility] Medicine Daily Progress Note    Date of Service  2/29/2020    Chief Complaint  85 y.o. male admitted 2/28/2020 with pancreatitis.     Interval Problem Update  Doesn't feel that he's ready to take anything PO. Pain is starting to improve and requiring IV pain medications a bit less frequently. Dose of dilaudid was increased to 1mg q1h yesterday. Wife at the bedside.     Consultants/Specialty  None    Code Status  Full    Disposition  Anticipate home in 2-3 days.     Review of Systems  Review of Systems   Constitutional: Positive for malaise/fatigue. Negative for chills and fever.   Respiratory: Negative for shortness of breath.    Cardiovascular: Negative for chest pain, palpitations and leg swelling.   Gastrointestinal: Positive for abdominal pain and nausea. Negative for blood in stool and vomiting (retching).   Genitourinary: Negative for dysuria.   Musculoskeletal: Negative for falls.   Neurological: Negative for dizziness, focal weakness, loss of consciousness and headaches.   Psychiatric/Behavioral: The patient is nervous/anxious.    All other systems reviewed and are negative.       Physical Exam  Temp:  [36.4 °C (97.5 °F)-37.1 °C (98.8 °F)] 37.1 °C (98.7 °F)  Pulse:  [] 89  Resp:  [17-22] 18  BP: (146-187)/() 167/86  SpO2:  [92 %-95 %] 93 %    Physical Exam  Vitals signs reviewed.   Constitutional:       General: He is not in acute distress.     Appearance: He is overweight. He is not toxic-appearing or diaphoretic.      Interventions: Nasal cannula in place.   HENT:      Head: Normocephalic.      Mouth/Throat:      Mouth: Mucous membranes are dry.   Eyes:      General: No scleral icterus.        Right eye: No discharge.         Left eye: No discharge.      Extraocular Movements: Extraocular movements intact.   Neck:      Musculoskeletal: Normal range of motion. No muscular tenderness.   Cardiovascular:      Rate and Rhythm: Normal rate and regular rhythm.      Pulses: Normal pulses.    Pulmonary:      Effort: Pulmonary effort is normal. No respiratory distress.      Breath sounds: No wheezing or rales.   Abdominal:      Palpations: Abdomen is soft.      Tenderness: There is abdominal tenderness (epigastric). There is no guarding or rebound.   Musculoskeletal:         General: No swelling or tenderness.   Skin:     General: Skin is warm and dry.      Capillary Refill: Capillary refill takes less than 2 seconds.      Coloration: Skin is not jaundiced.   Neurological:      General: No focal deficit present.      Mental Status: He is alert and oriented to person, place, and time.   Psychiatric:         Mood and Affect: Mood normal.         Speech: Speech normal.         Behavior: Behavior normal. Behavior is cooperative.         Fluids    Intake/Output Summary (Last 24 hours) at 2/29/2020 0637  Last data filed at 2/29/2020 0500  Gross per 24 hour   Intake 1000 ml   Output 600 ml   Net 400 ml       Laboratory  Recent Labs     02/28/20  0924 02/29/20  0242   WBC 10.5 13.6*   RBC 5.29 5.13   HEMOGLOBIN 17.5 16.8   HEMATOCRIT 50.0 49.2   MCV 94.5 95.9   MCH 33.1* 32.7   MCHC 35.0 34.1   RDW 47.0 49.4   PLATELETCT 180 173   MPV 9.9 9.8     Recent Labs     02/28/20  0924 02/29/20  0242   SODIUM 138 138   POTASSIUM 4.0 3.9   CHLORIDE 104 106   CO2 19* 21   GLUCOSE 220* 180*   BUN 17 16   CREATININE 1.05 0.91   CALCIUM 10.0 8.6             Recent Labs     02/28/20  0924   TRIGLYCERIDE 71       Imaging  CT-ABDOMEN-PELVIS WITH   Final Result      Significant inflammatory changes about the pancreatic head and body. Findings compatible with pancreatitis. Inflammation extends above the duodenum as well.      Pancreatic calcifications likely sequelae of chronic pancreatitis.      Atherosclerotic plaque.      Indeterminate 1.1 cm right renal lesion may represent a hemorrhagic/proteinaceous cyst but a solid lesion is not excluded. Further evaluation can be performed with renal protocol MRI.      Small hypodense  "renal lesions bilaterally are too small to characterize.      Bilateral nonobstructing renal calculi.               DX-CHEST-PORTABLE (1 VIEW)   Final Result      Bibasilar opacities may represent atelectasis or pneumonitis.      Atherosclerotic plaque.           Assessment/Plan  * Acute pancreatitis- (present on admission)  Assessment & Plan  Etiology of recurrence is unclear. TAG not elevated, denies drinking x3 years. CT showed evidence of chronic pancreatitis. Lipase elevate at 1991  - continue with supportive care: IVF, antiemetics, pain control  - discussed need to start PO pain medications  - NPO for bowel rest    Secondary hypertension- (present on admission)  Assessment & Plan  BP elevated. Likely contributing factors include pain and IVF.  - vasotec and hydralazine IV PRN  - pain control    Right renal mass- (present on admission)  Assessment & Plan  Was discussed on admission by admitting hospitalist. CT showed \"Indeterminate 1.1 cm right renal lesion may represent a hemorrhagic/proteinaceous cyst but a solid lesion is not excluded. Further evaluation can be performed with renal protocol MRI. Small hypodense renal lesions bilaterally are too small to characterize\"  - outpatient follow up recommended    Hyperglycemia- (present on admission)  Assessment & Plan  BS still elevated but improved from yesterday. A1C 6%  - monitor intermittently  - suspect 2/2 stress response from pain 2/2 pancreatitis    Nephrolithiasis- (present on admission)  Assessment & Plan  Noted on CT abdomen/pelvis; nonobstructive.  - IV fluids  - outpatient follow up as needed.       VTE prophylaxis: SCDs      "

## 2020-02-29 NOTE — CARE PLAN
Problem: Bowel/Gastric:  Goal: Normal bowel function is maintained or improved  Outcome: PROGRESSING AS EXPECTED  Note: Monitor patient's bowel function qshift and use PRNS if needed.      Problem: Knowledge Deficit  Goal: Knowledge of the prescribed therapeutic regimen will improve  Outcome: MET

## 2020-02-29 NOTE — PROGRESS NOTES
Initial /96. 1.25mg Vasotec IVP given. 5 min later /94. Will continue to check BP and monitor for medication effectiveness.

## 2020-02-29 NOTE — CARE PLAN
Problem: Safety  Goal: Will remain free from injury  Outcome: PROGRESSING AS EXPECTED  Note: Bed in lowest position, skid socks applied.      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

## 2020-02-29 NOTE — PROGRESS NOTES
Dr. Abbott paged in regards to patient's pain and uncontrolled blood pressure. MD returned page with updated as needed orders to assist in correcting BP and controlling acute pain. Patient on CPOX and located close to nursing station.

## 2020-03-01 PROBLEM — K59.03 DRUG-INDUCED CONSTIPATION: Status: ACTIVE | Noted: 2020-03-01

## 2020-03-01 LAB
ANION GAP SERPL CALC-SCNC: 7 MMOL/L (ref 0–11.9)
BASOPHILS # BLD AUTO: 0.3 % (ref 0–1.8)
BASOPHILS # BLD: 0.05 K/UL (ref 0–0.12)
BUN SERPL-MCNC: 16 MG/DL (ref 8–22)
CALCIUM SERPL-MCNC: 8 MG/DL (ref 8.5–10.5)
CHLORIDE SERPL-SCNC: 105 MMOL/L (ref 96–112)
CO2 SERPL-SCNC: 25 MMOL/L (ref 20–33)
CREAT SERPL-MCNC: 0.82 MG/DL (ref 0.5–1.4)
EOSINOPHIL # BLD AUTO: 0 K/UL (ref 0–0.51)
EOSINOPHIL NFR BLD: 0 % (ref 0–6.9)
ERYTHROCYTE [DISTWIDTH] IN BLOOD BY AUTOMATED COUNT: 48.6 FL (ref 35.9–50)
GLUCOSE SERPL-MCNC: 141 MG/DL (ref 65–99)
HCT VFR BLD AUTO: 47.7 % (ref 42–52)
HGB BLD-MCNC: 16.2 G/DL (ref 14–18)
IMM GRANULOCYTES # BLD AUTO: 0.14 K/UL (ref 0–0.11)
IMM GRANULOCYTES NFR BLD AUTO: 0.8 % (ref 0–0.9)
LIPASE SERPL-CCNC: 232 U/L (ref 11–82)
LYMPHOCYTES # BLD AUTO: 0.83 K/UL (ref 1–4.8)
LYMPHOCYTES NFR BLD: 4.9 % (ref 22–41)
MCH RBC QN AUTO: 32.3 PG (ref 27–33)
MCHC RBC AUTO-ENTMCNC: 34 G/DL (ref 33.7–35.3)
MCV RBC AUTO: 95.2 FL (ref 81.4–97.8)
MONOCYTES # BLD AUTO: 1.22 K/UL (ref 0–0.85)
MONOCYTES NFR BLD AUTO: 7.2 % (ref 0–13.4)
NEUTROPHILS # BLD AUTO: 14.65 K/UL (ref 1.82–7.42)
NEUTROPHILS NFR BLD: 86.8 % (ref 44–72)
NRBC # BLD AUTO: 0 K/UL
NRBC BLD-RTO: 0 /100 WBC
PLATELET # BLD AUTO: 145 K/UL (ref 164–446)
PMV BLD AUTO: 9.9 FL (ref 9–12.9)
POTASSIUM SERPL-SCNC: 3.6 MMOL/L (ref 3.6–5.5)
RBC # BLD AUTO: 5.01 M/UL (ref 4.7–6.1)
SODIUM SERPL-SCNC: 137 MMOL/L (ref 135–145)
WBC # BLD AUTO: 16.9 K/UL (ref 4.8–10.8)

## 2020-03-01 PROCEDURE — 700111 HCHG RX REV CODE 636 W/ 250 OVERRIDE (IP): Performed by: HOSPITALIST

## 2020-03-01 PROCEDURE — A9270 NON-COVERED ITEM OR SERVICE: HCPCS | Performed by: HOSPITALIST

## 2020-03-01 PROCEDURE — 85025 COMPLETE CBC W/AUTO DIFF WBC: CPT

## 2020-03-01 PROCEDURE — 700105 HCHG RX REV CODE 258: Performed by: HOSPITALIST

## 2020-03-01 PROCEDURE — 700102 HCHG RX REV CODE 250 W/ 637 OVERRIDE(OP): Performed by: HOSPITALIST

## 2020-03-01 PROCEDURE — 83690 ASSAY OF LIPASE: CPT

## 2020-03-01 PROCEDURE — 99232 SBSQ HOSP IP/OBS MODERATE 35: CPT | Performed by: HOSPITALIST

## 2020-03-01 PROCEDURE — 80048 BASIC METABOLIC PNL TOTAL CA: CPT

## 2020-03-01 PROCEDURE — 36415 COLL VENOUS BLD VENIPUNCTURE: CPT

## 2020-03-01 PROCEDURE — 770006 HCHG ROOM/CARE - MED/SURG/GYN SEMI*

## 2020-03-01 RX ORDER — OXYCODONE HYDROCHLORIDE 10 MG/1
10 TABLET ORAL
Status: DISCONTINUED | OUTPATIENT
Start: 2020-03-01 | End: 2020-03-02

## 2020-03-01 RX ORDER — OXYCODONE HYDROCHLORIDE 10 MG/1
10 TABLET ORAL
Status: DISCONTINUED | OUTPATIENT
Start: 2020-03-01 | End: 2020-03-01

## 2020-03-01 RX ORDER — HYDROMORPHONE HYDROCHLORIDE 1 MG/ML
1 INJECTION, SOLUTION INTRAMUSCULAR; INTRAVENOUS; SUBCUTANEOUS
Status: DISCONTINUED | OUTPATIENT
Start: 2020-03-01 | End: 2020-03-01

## 2020-03-01 RX ORDER — PAROXETINE 10 MG/1
10 TABLET, FILM COATED ORAL DAILY
Status: DISCONTINUED | OUTPATIENT
Start: 2020-03-02 | End: 2020-03-05 | Stop reason: HOSPADM

## 2020-03-01 RX ORDER — POTASSIUM CHLORIDE 20 MEQ/1
20 TABLET, EXTENDED RELEASE ORAL ONCE
Status: COMPLETED | OUTPATIENT
Start: 2020-03-01 | End: 2020-03-01

## 2020-03-01 RX ORDER — ATORVASTATIN CALCIUM 10 MG/1
10 TABLET, FILM COATED ORAL EVERY EVENING
Status: DISCONTINUED | OUTPATIENT
Start: 2020-03-01 | End: 2020-03-05 | Stop reason: HOSPADM

## 2020-03-01 RX ORDER — OXYCODONE HYDROCHLORIDE 5 MG/1
5 TABLET ORAL
Status: DISCONTINUED | OUTPATIENT
Start: 2020-03-01 | End: 2020-03-01

## 2020-03-01 RX ORDER — OXYCODONE HYDROCHLORIDE 5 MG/1
5 TABLET ORAL
Status: DISCONTINUED | OUTPATIENT
Start: 2020-03-01 | End: 2020-03-02

## 2020-03-01 RX ORDER — HYDROMORPHONE HYDROCHLORIDE 1 MG/ML
0.5 INJECTION, SOLUTION INTRAMUSCULAR; INTRAVENOUS; SUBCUTANEOUS
Status: DISCONTINUED | OUTPATIENT
Start: 2020-03-01 | End: 2020-03-02

## 2020-03-01 RX ADMIN — ENALAPRILAT 1.25 MG: 1.25 INJECTION INTRAVENOUS at 17:03

## 2020-03-01 RX ADMIN — HYDROMORPHONE HYDROCHLORIDE 0.5 MG: 1 INJECTION, SOLUTION INTRAMUSCULAR; INTRAVENOUS; SUBCUTANEOUS at 18:53

## 2020-03-01 RX ADMIN — SODIUM CHLORIDE, POTASSIUM CHLORIDE, SODIUM LACTATE AND CALCIUM CHLORIDE: 600; 310; 30; 20 INJECTION, SOLUTION INTRAVENOUS at 07:50

## 2020-03-01 RX ADMIN — HYDRALAZINE HYDROCHLORIDE 10 MG: 20 INJECTION INTRAMUSCULAR; INTRAVENOUS at 20:38

## 2020-03-01 RX ADMIN — OXYCODONE HYDROCHLORIDE 10 MG: 10 TABLET ORAL at 17:03

## 2020-03-01 RX ADMIN — HYDROMORPHONE HYDROCHLORIDE 1 MG: 1 INJECTION, SOLUTION INTRAMUSCULAR; INTRAVENOUS; SUBCUTANEOUS at 07:53

## 2020-03-01 RX ADMIN — POTASSIUM CHLORIDE 20 MEQ: 1500 TABLET, EXTENDED RELEASE ORAL at 06:30

## 2020-03-01 RX ADMIN — HYDROMORPHONE HYDROCHLORIDE 1 MG: 1 INJECTION, SOLUTION INTRAMUSCULAR; INTRAVENOUS; SUBCUTANEOUS at 03:11

## 2020-03-01 RX ADMIN — LATANOPROST 1 DROP: 50 SOLUTION OPHTHALMIC at 20:39

## 2020-03-01 RX ADMIN — OXYCODONE HYDROCHLORIDE 10 MG: 10 TABLET ORAL at 10:54

## 2020-03-01 RX ADMIN — SODIUM CHLORIDE, POTASSIUM CHLORIDE, SODIUM LACTATE AND CALCIUM CHLORIDE: 600; 310; 30; 20 INJECTION, SOLUTION INTRAVENOUS at 15:43

## 2020-03-01 RX ADMIN — ATORVASTATIN CALCIUM 10 MG: 10 TABLET, FILM COATED ORAL at 20:38

## 2020-03-01 RX ADMIN — SENNOSIDES AND DOCUSATE SODIUM 2 TABLET: 8.6; 5 TABLET ORAL at 17:03

## 2020-03-01 RX ADMIN — HYDROMORPHONE HYDROCHLORIDE 0.5 MG: 1 INJECTION, SOLUTION INTRAMUSCULAR; INTRAVENOUS; SUBCUTANEOUS at 11:52

## 2020-03-01 RX ADMIN — SENNOSIDES AND DOCUSATE SODIUM 2 TABLET: 8.6; 5 TABLET ORAL at 06:05

## 2020-03-01 ASSESSMENT — ENCOUNTER SYMPTOMS
ABDOMINAL PAIN: 1
FOCAL WEAKNESS: 0
DIZZINESS: 0
PALPITATIONS: 0
SHORTNESS OF BREATH: 0
NAUSEA: 1
LOSS OF CONSCIOUSNESS: 0
CONSTIPATION: 1
WEAKNESS: 1
HEADACHES: 0
FALLS: 0
FEVER: 0
VOMITING: 0
COUGH: 0
CHILLS: 0
NERVOUS/ANXIOUS: 1

## 2020-03-01 NOTE — PROGRESS NOTES
"[facility] Medicine Daily Progress Note    Date of Service  3/1/2020    Chief Complaint  85 y.o. male admitted 2/28/2020 with pancreatitis.     Interval Problem Update  Thinks he's getting better but continues to have some \"retching\". Has not had a BM since before his admission, per patient. Abd more distended today. Again discussed importance of taper off pain medications and taking a bowel regimen    Consultants/Specialty  None    Code Status  Full    Disposition  Anticipate home in 2-3 days.     Review of Systems  Review of Systems   Constitutional: Positive for malaise/fatigue. Negative for chills and fever.   Respiratory: Negative for cough and shortness of breath.    Cardiovascular: Negative for chest pain, palpitations and leg swelling.   Gastrointestinal: Positive for abdominal pain, constipation and nausea. Negative for vomiting (retching).   Genitourinary: Negative for dysuria.   Musculoskeletal: Negative for falls.   Neurological: Positive for weakness. Negative for dizziness, focal weakness, loss of consciousness and headaches.   Psychiatric/Behavioral: The patient is nervous/anxious.    All other systems reviewed and are negative.       Physical Exam  Temp:  [36.6 °C (97.8 °F)-36.6 °C (97.9 °F)] 36.6 °C (97.8 °F)  Pulse:  [89-93] 92  Resp:  [16-20] 17  BP: (137-162)/(81-87) 145/81  SpO2:  [92 %-94 %] 93 %    Physical Exam  Vitals signs reviewed.   Constitutional:       General: He is not in acute distress.     Appearance: He is overweight. He is not toxic-appearing or diaphoretic.      Interventions: Nasal cannula in place.   HENT:      Head: Normocephalic.      Nose: No congestion.      Mouth/Throat:      Comments: Tacky mucous membranes  Eyes:      General:         Right eye: No discharge.         Left eye: No discharge.      Extraocular Movements: Extraocular movements intact.   Neck:      Musculoskeletal: Normal range of motion. No muscular tenderness.   Cardiovascular:      Rate and Rhythm: Normal " rate and regular rhythm.      Pulses: Normal pulses.   Pulmonary:      Effort: Pulmonary effort is normal. No respiratory distress.      Breath sounds: No wheezing or rales.   Abdominal:      General: Bowel sounds are normal. There is distension.      Palpations: Abdomen is soft.      Tenderness: There is abdominal tenderness (epigastric). There is no guarding or rebound.   Musculoskeletal:         General: No swelling or tenderness.   Skin:     General: Skin is warm and dry.      Capillary Refill: Capillary refill takes less than 2 seconds.      Coloration: Skin is not jaundiced.   Neurological:      General: No focal deficit present.      Mental Status: He is alert and oriented to person, place, and time.   Psychiatric:         Mood and Affect: Mood normal.         Speech: Speech normal.         Behavior: Behavior normal. Behavior is cooperative.         Fluids    Intake/Output Summary (Last 24 hours) at 3/1/2020 0623  Last data filed at 2/29/2020 2300  Gross per 24 hour   Intake --   Output 700 ml   Net -700 ml       Laboratory  Recent Labs     02/28/20  0924 02/29/20  0242 03/01/20  0241   WBC 10.5 13.6* 16.9*   RBC 5.29 5.13 5.01   HEMOGLOBIN 17.5 16.8 16.2   HEMATOCRIT 50.0 49.2 47.7   MCV 94.5 95.9 95.2   MCH 33.1* 32.7 32.3   MCHC 35.0 34.1 34.0   RDW 47.0 49.4 48.6   PLATELETCT 180 173 145*   MPV 9.9 9.8 9.9     Recent Labs     02/28/20  0924 02/29/20  0242 03/01/20  0241   SODIUM 138 138 137   POTASSIUM 4.0 3.9 3.6   CHLORIDE 104 106 105   CO2 19* 21 25   GLUCOSE 220* 180* 141*   BUN 17 16 16   CREATININE 1.05 0.91 0.82   CALCIUM 10.0 8.6 8.0*             Recent Labs     02/28/20  0924   TRIGLYCERIDE 71       Imaging  CT-ABDOMEN-PELVIS WITH   Final Result      Significant inflammatory changes about the pancreatic head and body. Findings compatible with pancreatitis. Inflammation extends above the duodenum as well.      Pancreatic calcifications likely sequelae of chronic pancreatitis.      Atherosclerotic  "plaque.      Indeterminate 1.1 cm right renal lesion may represent a hemorrhagic/proteinaceous cyst but a solid lesion is not excluded. Further evaluation can be performed with renal protocol MRI.      Small hypodense renal lesions bilaterally are too small to characterize.      Bilateral nonobstructing renal calculi.               DX-CHEST-PORTABLE (1 VIEW)   Final Result      Bibasilar opacities may represent atelectasis or pneumonitis.      Atherosclerotic plaque.           Assessment/Plan  * Acute pancreatitis- (present on admission)  Assessment & Plan  Etiology of recurrence is unclear. TAG not elevated, denies drinking x3 years. CT showed evidence of chronic pancreatitis. Lipase elevate at 1991--> 232 today.   - continue with supportive care: IVF (reduce to 100cc/hr), antiemetics, pain control  - oxy 5-10mg PRN, taper dilaudid from 1mg q2 hrs to .5mg q3 hours  - start clear liquid diet    Secondary hypertension- (present on admission)  Assessment & Plan  BP elevated on admission, now slowly improving. Likely contributing factors include pain and IVF.  - vasotec and hydralazine IV PRN  - pain control  - reduce IVF    Right renal mass- (present on admission)  Assessment & Plan  Was discussed on admission by admitting hospitalist. CT showed \"Indeterminate 1.1 cm right renal lesion may represent a hemorrhagic/proteinaceous cyst but a solid lesion is not excluded. Further evaluation can be performed with renal protocol MRI. Small hypodense renal lesions bilaterally are too small to characterize\"  - outpatient follow up recommended    Drug-induced constipation  Assessment & Plan  Patient was declining any PO pain medications. Dilaudid was ordered at 1mg q1 hr on admission, I am now tapering down.  - aggressive bowel regimen  - limit narcotics as able    Hyperglycemia- (present on admission)  Assessment & Plan  BS still elevated but improved from yesterday. A1C controlled at 6%.  - monitor intermittently  - suspect " 2/2 stress response from pain 2/2 pancreatitis    Nephrolithiasis- (present on admission)  Assessment & Plan  Noted on CT abdomen/pelvis; nonobstructive.  - IV fluids  - outpatient follow up as needed.       VTE prophylaxis: SCDs

## 2020-03-01 NOTE — CARE PLAN
Problem: Bowel/Gastric:  Goal: Will not experience complications related to bowel motility  Outcome: PROGRESSING AS EXPECTED     Problem: Pain Management  Goal: Pain level will decrease to patient's comfort goal  Outcome: PROGRESSING AS EXPECTED

## 2020-03-01 NOTE — CARE PLAN
Problem: Communication  Goal: The ability to communicate needs accurately and effectively will improve  Outcome: PROGRESSING AS EXPECTED     Problem: Safety  Goal: Will remain free from injury  Outcome: PROGRESSING AS EXPECTED  Goal: Will remain free from falls  Outcome: PROGRESSING AS EXPECTED     Problem: Infection  Goal: Will remain free from infection  Outcome: PROGRESSING AS EXPECTED     Problem: Venous Thromboembolism (VTW)/Deep Vein Thrombosis (DVT) Prevention:  Goal: Patient will participate in Venous Thrombosis (VTE)/Deep Vein Thrombosis (DVT)Prevention Measures  Outcome: PROGRESSING AS EXPECTED     Problem: Bowel/Gastric:  Goal: Normal bowel function is maintained or improved  Outcome: PROGRESSING AS EXPECTED     Problem: Discharge Barriers/Planning  Goal: Patient's continuum of care needs will be met  Outcome: PROGRESSING AS EXPECTED

## 2020-03-01 NOTE — PROGRESS NOTES
Received report from night shift RN and assumed care of patient. Patient awake in bed during bedside report. Patient is A&O x 4. Patient reports 8/10 pain in his abdomen at this time. Patient medicated per mar. Pain not relieved by oxycodone. Given dilaudid as well.  Patient continues to experience nausea and has not eaten breakfast or lunch for fear of vomiting. No other signs of distress at this time. Bed is in lowest, locked position, call light and belongings are within reach. Patient calls for assistance and bed alarm is off.  All other needs met.

## 2020-03-02 PROBLEM — E87.6 HYPOKALEMIA: Status: ACTIVE | Noted: 2020-03-02

## 2020-03-02 LAB
ANION GAP SERPL CALC-SCNC: 9 MMOL/L (ref 0–11.9)
BASOPHILS # BLD AUTO: 0.3 % (ref 0–1.8)
BASOPHILS # BLD: 0.05 K/UL (ref 0–0.12)
BUN SERPL-MCNC: 15 MG/DL (ref 8–22)
CALCIUM SERPL-MCNC: 8.3 MG/DL (ref 8.5–10.5)
CHLORIDE SERPL-SCNC: 104 MMOL/L (ref 96–112)
CO2 SERPL-SCNC: 22 MMOL/L (ref 20–33)
CREAT SERPL-MCNC: 0.83 MG/DL (ref 0.5–1.4)
EOSINOPHIL # BLD AUTO: 0 K/UL (ref 0–0.51)
EOSINOPHIL NFR BLD: 0 % (ref 0–6.9)
ERYTHROCYTE [DISTWIDTH] IN BLOOD BY AUTOMATED COUNT: 47.9 FL (ref 35.9–50)
GLUCOSE SERPL-MCNC: 140 MG/DL (ref 65–99)
HCT VFR BLD AUTO: 46.2 % (ref 42–52)
HGB BLD-MCNC: 15.7 G/DL (ref 14–18)
IMM GRANULOCYTES # BLD AUTO: 0.21 K/UL (ref 0–0.11)
IMM GRANULOCYTES NFR BLD AUTO: 1.2 % (ref 0–0.9)
LYMPHOCYTES # BLD AUTO: 0.61 K/UL (ref 1–4.8)
LYMPHOCYTES NFR BLD: 3.6 % (ref 22–41)
MAGNESIUM SERPL-MCNC: 1.7 MG/DL (ref 1.5–2.5)
MCH RBC QN AUTO: 32.6 PG (ref 27–33)
MCHC RBC AUTO-ENTMCNC: 34 G/DL (ref 33.7–35.3)
MCV RBC AUTO: 95.9 FL (ref 81.4–97.8)
MONOCYTES # BLD AUTO: 1.21 K/UL (ref 0–0.85)
MONOCYTES NFR BLD AUTO: 7.2 % (ref 0–13.4)
NEUTROPHILS # BLD AUTO: 14.79 K/UL (ref 1.82–7.42)
NEUTROPHILS NFR BLD: 87.7 % (ref 44–72)
NRBC # BLD AUTO: 0 K/UL
NRBC BLD-RTO: 0 /100 WBC
PLATELET # BLD AUTO: 134 K/UL (ref 164–446)
PMV BLD AUTO: 10 FL (ref 9–12.9)
POTASSIUM SERPL-SCNC: 3.4 MMOL/L (ref 3.6–5.5)
RBC # BLD AUTO: 4.82 M/UL (ref 4.7–6.1)
SODIUM SERPL-SCNC: 135 MMOL/L (ref 135–145)
WBC # BLD AUTO: 16.9 K/UL (ref 4.8–10.8)

## 2020-03-02 PROCEDURE — 700102 HCHG RX REV CODE 250 W/ 637 OVERRIDE(OP): Performed by: HOSPITALIST

## 2020-03-02 PROCEDURE — 85025 COMPLETE CBC W/AUTO DIFF WBC: CPT

## 2020-03-02 PROCEDURE — 700105 HCHG RX REV CODE 258: Performed by: HOSPITALIST

## 2020-03-02 PROCEDURE — 80048 BASIC METABOLIC PNL TOTAL CA: CPT

## 2020-03-02 PROCEDURE — A9270 NON-COVERED ITEM OR SERVICE: HCPCS | Performed by: HOSPITALIST

## 2020-03-02 PROCEDURE — 770006 HCHG ROOM/CARE - MED/SURG/GYN SEMI*

## 2020-03-02 PROCEDURE — 99232 SBSQ HOSP IP/OBS MODERATE 35: CPT | Performed by: HOSPITALIST

## 2020-03-02 PROCEDURE — 36415 COLL VENOUS BLD VENIPUNCTURE: CPT

## 2020-03-02 PROCEDURE — 83735 ASSAY OF MAGNESIUM: CPT

## 2020-03-02 PROCEDURE — 700111 HCHG RX REV CODE 636 W/ 250 OVERRIDE (IP): Performed by: HOSPITALIST

## 2020-03-02 RX ORDER — HYDROMORPHONE HYDROCHLORIDE 1 MG/ML
0.5 INJECTION, SOLUTION INTRAMUSCULAR; INTRAVENOUS; SUBCUTANEOUS EVERY 8 HOURS PRN
Status: DISCONTINUED | OUTPATIENT
Start: 2020-03-02 | End: 2020-03-05 | Stop reason: HOSPADM

## 2020-03-02 RX ORDER — OXYCODONE HYDROCHLORIDE 10 MG/1
10 TABLET ORAL EVERY 4 HOURS PRN
Status: DISCONTINUED | OUTPATIENT
Start: 2020-03-02 | End: 2020-03-05 | Stop reason: HOSPADM

## 2020-03-02 RX ORDER — OXYCODONE HYDROCHLORIDE 5 MG/1
5 TABLET ORAL EVERY 4 HOURS PRN
Status: DISCONTINUED | OUTPATIENT
Start: 2020-03-02 | End: 2020-03-05 | Stop reason: HOSPADM

## 2020-03-02 RX ADMIN — SODIUM CHLORIDE, POTASSIUM CHLORIDE, SODIUM LACTATE AND CALCIUM CHLORIDE: 600; 310; 30; 20 INJECTION, SOLUTION INTRAVENOUS at 20:05

## 2020-03-02 RX ADMIN — PAROXETINE HYDROCHLORIDE 10 MG: 10 TABLET, FILM COATED ORAL at 03:43

## 2020-03-02 RX ADMIN — SENNOSIDES AND DOCUSATE SODIUM 2 TABLET: 8.6; 5 TABLET ORAL at 03:43

## 2020-03-02 RX ADMIN — SODIUM CHLORIDE, POTASSIUM CHLORIDE, SODIUM LACTATE AND CALCIUM CHLORIDE: 600; 310; 30; 20 INJECTION, SOLUTION INTRAVENOUS at 01:59

## 2020-03-02 RX ADMIN — OXYCODONE HYDROCHLORIDE 10 MG: 10 TABLET ORAL at 16:05

## 2020-03-02 RX ADMIN — SODIUM CHLORIDE, POTASSIUM CHLORIDE, SODIUM LACTATE AND CALCIUM CHLORIDE: 600; 310; 30; 20 INJECTION, SOLUTION INTRAVENOUS at 11:23

## 2020-03-02 RX ADMIN — SENNOSIDES AND DOCUSATE SODIUM 2 TABLET: 8.6; 5 TABLET ORAL at 16:06

## 2020-03-02 RX ADMIN — HYDROMORPHONE HYDROCHLORIDE 0.5 MG: 1 INJECTION, SOLUTION INTRAMUSCULAR; INTRAVENOUS; SUBCUTANEOUS at 05:04

## 2020-03-02 RX ADMIN — LATANOPROST 1 DROP: 50 SOLUTION OPHTHALMIC at 20:05

## 2020-03-02 RX ADMIN — OXYCODONE HYDROCHLORIDE 5 MG: 5 TABLET ORAL at 03:43

## 2020-03-02 RX ADMIN — HYDROMORPHONE HYDROCHLORIDE 0.5 MG: 1 INJECTION, SOLUTION INTRAMUSCULAR; INTRAVENOUS; SUBCUTANEOUS at 17:17

## 2020-03-02 RX ADMIN — ATORVASTATIN CALCIUM 10 MG: 10 TABLET, FILM COATED ORAL at 16:05

## 2020-03-02 ASSESSMENT — ENCOUNTER SYMPTOMS
DIZZINESS: 0
WEAKNESS: 1
NERVOUS/ANXIOUS: 1
CONSTIPATION: 1
NAUSEA: 0
PALPITATIONS: 0
COUGH: 0
ABDOMINAL PAIN: 1
HEADACHES: 0
SHORTNESS OF BREATH: 0
VOMITING: 0
FOCAL WEAKNESS: 0
FEVER: 0
CHILLS: 0
LOSS OF CONSCIOUSNESS: 0
FALLS: 0

## 2020-03-02 NOTE — PROGRESS NOTES
Received report from night shift RN and assumed care of patient. Patient lying in bed with door closed during bedside report. Patient is A&O x 4. Patient reports 2/10 to 7/10 pain in his abdomen at this time. Patient states if he isn't retching or belching the pain is manageable.  Patient declines medication. Patient showered today and is resting comfortably in bed. No other signs of distress at this time. Bed is in lowest, locked position, call light and belongings are within reach. Patient calls for assistance and bed alarm is off.  All other needs met.

## 2020-03-02 NOTE — PROGRESS NOTES
[facility] Medicine Daily Progress Note    Date of Service  3/2/2020    Chief Complaint  85 y.o. male admitted 2/28/2020 with pancreatitis.     Interval Problem Update  Endorses flatus but no BM. Tolerating some liquids this AM but hesitant to try. No overnight events.     Consultants/Specialty  None    Code Status  Full    Disposition  Anticipate home in 1-2 days.     Review of Systems  Review of Systems   Constitutional: Positive for malaise/fatigue. Negative for chills and fever.   Respiratory: Negative for cough and shortness of breath.    Cardiovascular: Negative for chest pain, palpitations and leg swelling.   Gastrointestinal: Positive for abdominal pain and constipation. Negative for nausea and vomiting (retching).   Genitourinary: Negative for dysuria.   Musculoskeletal: Negative for falls.   Neurological: Positive for weakness. Negative for dizziness, focal weakness, loss of consciousness and headaches.   Psychiatric/Behavioral: The patient is nervous/anxious.    All other systems reviewed and are negative.       Physical Exam  Temp:  [36.6 °C (97.9 °F)-37.9 °C (100.3 °F)] 37.6 °C (99.6 °F)  Pulse:  [89-95] 95  Resp:  [16-18] 18  BP: (152-171)/(86-96) 156/89  SpO2:  [92 %-94 %] 92 %    Physical Exam  Vitals signs reviewed.   Constitutional:       General: He is not in acute distress.     Appearance: He is overweight. He is not toxic-appearing or diaphoretic.      Interventions: Nasal cannula in place.   HENT:      Head: Normocephalic.      Nose: No congestion.      Mouth/Throat:      Mouth: Mucous membranes are moist.   Eyes:      General:         Right eye: No discharge.         Left eye: No discharge.      Extraocular Movements: Extraocular movements intact.   Neck:      Musculoskeletal: Normal range of motion. No muscular tenderness.   Cardiovascular:      Rate and Rhythm: Normal rate and regular rhythm.      Pulses: Normal pulses.   Pulmonary:      Effort: Pulmonary effort is normal. No respiratory  distress.      Breath sounds: No wheezing or rales.   Abdominal:      General: Bowel sounds are normal. There is distension.      Palpations: Abdomen is soft.      Tenderness: There is abdominal tenderness (epigastric, significantly improved). There is no guarding or rebound.   Musculoskeletal:         General: No swelling or tenderness.   Skin:     General: Skin is warm and dry.      Capillary Refill: Capillary refill takes less than 2 seconds.      Coloration: Skin is not jaundiced.   Neurological:      General: No focal deficit present.      Mental Status: He is alert and oriented to person, place, and time.   Psychiatric:         Mood and Affect: Mood normal.         Speech: Speech normal.         Behavior: Behavior normal. Behavior is cooperative.     Patient seen and examined today on 3/2, unchanged from 3/1 except as noted above.     Fluids    Intake/Output Summary (Last 24 hours) at 3/2/2020 0754  Last data filed at 3/2/2020 0203  Gross per 24 hour   Intake 200 ml   Output 1800 ml   Net -1600 ml       Laboratory  Recent Labs     02/29/20  0242 03/01/20  0241 03/02/20  0219   WBC 13.6* 16.9* 16.9*   RBC 5.13 5.01 4.82   HEMOGLOBIN 16.8 16.2 15.7   HEMATOCRIT 49.2 47.7 46.2   MCV 95.9 95.2 95.9   MCH 32.7 32.3 32.6   MCHC 34.1 34.0 34.0   RDW 49.4 48.6 47.9   PLATELETCT 173 145* 134*   MPV 9.8 9.9 10.0     Recent Labs     02/29/20  0242 03/01/20  0241 03/02/20  0219   SODIUM 138 137 135   POTASSIUM 3.9 3.6 3.4*   CHLORIDE 106 105 104   CO2 21 25 22   GLUCOSE 180* 141* 140*   BUN 16 16 15   CREATININE 0.91 0.82 0.83   CALCIUM 8.6 8.0* 8.3*             Recent Labs     02/28/20  0924   TRIGLYCERIDE 71       Imaging  CT-ABDOMEN-PELVIS WITH   Final Result      Significant inflammatory changes about the pancreatic head and body. Findings compatible with pancreatitis. Inflammation extends above the duodenum as well.      Pancreatic calcifications likely sequelae of chronic pancreatitis.      Atherosclerotic plaque.  "     Indeterminate 1.1 cm right renal lesion may represent a hemorrhagic/proteinaceous cyst but a solid lesion is not excluded. Further evaluation can be performed with renal protocol MRI.      Small hypodense renal lesions bilaterally are too small to characterize.      Bilateral nonobstructing renal calculi.               DX-CHEST-PORTABLE (1 VIEW)   Final Result      Bibasilar opacities may represent atelectasis or pneumonitis.      Atherosclerotic plaque.           Assessment/Plan  * Acute pancreatitis- (present on admission)  Assessment & Plan  Etiology of recurrence is unclear. TAG not elevated, denies drinking x3 years. CT showed evidence of chronic pancreatitis. Lipase elevate at 1991--> 232.   - continue with supportive care: IVF (reduce to 100cc/hr), antiemetics, pain control  - reduce to oxy 5-10mg q4 h PRN, and dilaudid from 1mg q2 hrs to .5mg q8 hours  - advance to full liquid diet  - aggressive bowel regimen; encourage ambulation    Secondary hypertension- (present on admission)  Assessment & Plan  BP elevated on admission, now improving. Likely contributing factors include pain and IVF.  - vasotec and hydralazine IV PRN  - pain control  - reduced IVF    Right renal mass- (present on admission)  Assessment & Plan  Was discussed on admission by admitting hospitalist. CT showed \"Indeterminate 1.1 cm right renal lesion may represent a hemorrhagic/proteinaceous cyst but a solid lesion is not excluded. Further evaluation can be performed with renal protocol MRI. Small hypodense renal lesions bilaterally are too small to characterize\".  - outpatient follow up recommended    Hypokalemia  Assessment & Plan  Down to 3.4 today.  - monitor and replete    Drug-induced constipation  Assessment & Plan  Patient was declining any PO pain medications. Dilaudid was ordered at 1mg q1 hr on admission, continuing to taper down.   - aggressive bowel regimen  - limit narcotics as able    Hyperglycemia- (present on " admission)  Assessment & Plan  BS still elevated but improved from yesterday. A1C controlled at 6%.  - monitor intermittently  - suspect 2/2 stress response from pain 2/2 pancreatitis    Nephrolithiasis- (present on admission)  Assessment & Plan  Noted on CT abdomen/pelvis; nonobstructive.  - IV fluids  - outpatient follow up as needed.       VTE prophylaxis: SCDs

## 2020-03-02 NOTE — CARE PLAN
Problem: Safety  Goal: Will remain free from falls  Outcome: PROGRESSING AS EXPECTED  Note: Fall precautions in place. Bed in lowest position. Non-skid socks in place. Personal possessions within reach. Mobility sign on door.  Call light within reach. Pt educated regarding fall prevention and states understanding.      Problem: Pain Management  Goal: Pain level will decrease to patient's comfort goal  Outcome: PROGRESSING AS EXPECTED  Flowsheets (Taken 3/2/2020 4361)  Comfort Goal: Comfort at Rest  Non Verbal Scale:   Calm   Sleeping  Pain Rating Scale (NPRS): 2  Note: Patient has not requested pain medication today and pain level has decreased per patient report.

## 2020-03-02 NOTE — CARE PLAN
Problem: Communication  Goal: The ability to communicate needs accurately and effectively will improve  Outcome: PROGRESSING AS EXPECTED     Problem: Safety  Goal: Will remain free from injury  Outcome: PROGRESSING AS EXPECTED  Goal: Will remain free from falls  Outcome: PROGRESSING AS EXPECTED     Problem: Infection  Goal: Will remain free from infection  Outcome: PROGRESSING AS EXPECTED     Problem: Venous Thromboembolism (VTW)/Deep Vein Thrombosis (DVT) Prevention:  Goal: Patient will participate in Venous Thrombosis (VTE)/Deep Vein Thrombosis (DVT)Prevention Measures  Outcome: PROGRESSING AS EXPECTED     Problem: Bowel/Gastric:  Goal: Will not experience complications related to bowel motility  Outcome: PROGRESSING AS EXPECTED

## 2020-03-03 LAB
ANION GAP SERPL CALC-SCNC: 7 MMOL/L (ref 0–11.9)
BASOPHILS # BLD AUTO: 0.3 % (ref 0–1.8)
BASOPHILS # BLD: 0.04 K/UL (ref 0–0.12)
BUN SERPL-MCNC: 15 MG/DL (ref 8–22)
CALCIUM SERPL-MCNC: 8.4 MG/DL (ref 8.5–10.5)
CHLORIDE SERPL-SCNC: 105 MMOL/L (ref 96–112)
CO2 SERPL-SCNC: 25 MMOL/L (ref 20–33)
CREAT SERPL-MCNC: 0.86 MG/DL (ref 0.5–1.4)
EOSINOPHIL # BLD AUTO: 0.02 K/UL (ref 0–0.51)
EOSINOPHIL NFR BLD: 0.1 % (ref 0–6.9)
ERYTHROCYTE [DISTWIDTH] IN BLOOD BY AUTOMATED COUNT: 46.4 FL (ref 35.9–50)
GLUCOSE SERPL-MCNC: 118 MG/DL (ref 65–99)
HCT VFR BLD AUTO: 43.3 % (ref 42–52)
HGB BLD-MCNC: 14.8 G/DL (ref 14–18)
IMM GRANULOCYTES # BLD AUTO: 0.16 K/UL (ref 0–0.11)
IMM GRANULOCYTES NFR BLD AUTO: 1 % (ref 0–0.9)
LYMPHOCYTES # BLD AUTO: 0.76 K/UL (ref 1–4.8)
LYMPHOCYTES NFR BLD: 4.9 % (ref 22–41)
MCH RBC QN AUTO: 32.2 PG (ref 27–33)
MCHC RBC AUTO-ENTMCNC: 34.2 G/DL (ref 33.7–35.3)
MCV RBC AUTO: 94.1 FL (ref 81.4–97.8)
MONOCYTES # BLD AUTO: 1.49 K/UL (ref 0–0.85)
MONOCYTES NFR BLD AUTO: 9.7 % (ref 0–13.4)
NEUTROPHILS # BLD AUTO: 12.9 K/UL (ref 1.82–7.42)
NEUTROPHILS NFR BLD: 84 % (ref 44–72)
NRBC # BLD AUTO: 0 K/UL
NRBC BLD-RTO: 0 /100 WBC
PLATELET # BLD AUTO: 144 K/UL (ref 164–446)
PMV BLD AUTO: 10.4 FL (ref 9–12.9)
POTASSIUM SERPL-SCNC: 3.2 MMOL/L (ref 3.6–5.5)
RBC # BLD AUTO: 4.6 M/UL (ref 4.7–6.1)
SODIUM SERPL-SCNC: 137 MMOL/L (ref 135–145)
WBC # BLD AUTO: 15.4 K/UL (ref 4.8–10.8)

## 2020-03-03 PROCEDURE — 770006 HCHG ROOM/CARE - MED/SURG/GYN SEMI*

## 2020-03-03 PROCEDURE — 36415 COLL VENOUS BLD VENIPUNCTURE: CPT

## 2020-03-03 PROCEDURE — A9270 NON-COVERED ITEM OR SERVICE: HCPCS | Performed by: HOSPITALIST

## 2020-03-03 PROCEDURE — 85025 COMPLETE CBC W/AUTO DIFF WBC: CPT

## 2020-03-03 PROCEDURE — 700105 HCHG RX REV CODE 258: Performed by: HOSPITALIST

## 2020-03-03 PROCEDURE — 700102 HCHG RX REV CODE 250 W/ 637 OVERRIDE(OP): Performed by: HOSPITALIST

## 2020-03-03 PROCEDURE — 80048 BASIC METABOLIC PNL TOTAL CA: CPT

## 2020-03-03 PROCEDURE — 99232 SBSQ HOSP IP/OBS MODERATE 35: CPT | Performed by: HOSPITALIST

## 2020-03-03 RX ORDER — FAMOTIDINE 20 MG/1
20 TABLET, FILM COATED ORAL 2 TIMES DAILY
Status: DISCONTINUED | OUTPATIENT
Start: 2020-03-03 | End: 2020-03-05 | Stop reason: HOSPADM

## 2020-03-03 RX ORDER — POTASSIUM CHLORIDE 20 MEQ/1
40 TABLET, EXTENDED RELEASE ORAL ONCE
Status: COMPLETED | OUTPATIENT
Start: 2020-03-03 | End: 2020-03-03

## 2020-03-03 RX ADMIN — OXYCODONE HYDROCHLORIDE 10 MG: 10 TABLET ORAL at 18:02

## 2020-03-03 RX ADMIN — POTASSIUM CHLORIDE 40 MEQ: 1500 TABLET, EXTENDED RELEASE ORAL at 09:35

## 2020-03-03 RX ADMIN — SENNOSIDES AND DOCUSATE SODIUM 2 TABLET: 8.6; 5 TABLET ORAL at 05:13

## 2020-03-03 RX ADMIN — FAMOTIDINE 20 MG: 20 TABLET ORAL at 18:02

## 2020-03-03 RX ADMIN — PAROXETINE HYDROCHLORIDE 10 MG: 10 TABLET, FILM COATED ORAL at 05:13

## 2020-03-03 RX ADMIN — SODIUM CHLORIDE, POTASSIUM CHLORIDE, SODIUM LACTATE AND CALCIUM CHLORIDE: 600; 310; 30; 20 INJECTION, SOLUTION INTRAVENOUS at 17:55

## 2020-03-03 RX ADMIN — Medication 400 MG: at 09:35

## 2020-03-03 RX ADMIN — ATORVASTATIN CALCIUM 10 MG: 10 TABLET, FILM COATED ORAL at 18:02

## 2020-03-03 RX ADMIN — LATANOPROST 1 DROP: 50 SOLUTION OPHTHALMIC at 20:35

## 2020-03-03 ASSESSMENT — ENCOUNTER SYMPTOMS
SINUS PAIN: 0
PALPITATIONS: 0
ABDOMINAL PAIN: 1
WEAKNESS: 0
FALLS: 0
HALLUCINATIONS: 0
SHORTNESS OF BREATH: 0
COUGH: 0
HEADACHES: 0
FEVER: 0
DIZZINESS: 0
VOMITING: 0
CONSTIPATION: 1
NAUSEA: 0
NERVOUS/ANXIOUS: 0
SPUTUM PRODUCTION: 0
FOCAL WEAKNESS: 0

## 2020-03-03 ASSESSMENT — LIFESTYLE VARIABLES: SUBSTANCE_ABUSE: 0

## 2020-03-03 NOTE — CARE PLAN
Problem: Communication  Goal: The ability to communicate needs accurately and effectively will improve  Outcome: PROGRESSING AS EXPECTED  Note: Pt communicates all needs appropriately     Problem: Safety  Goal: Will remain free from injury  Outcome: PROGRESSING AS EXPECTED  Note: Pt remains injury free

## 2020-03-04 ENCOUNTER — APPOINTMENT (OUTPATIENT)
Dept: RADIOLOGY | Facility: MEDICAL CENTER | Age: 85
DRG: 440 | End: 2020-03-04
Attending: HOSPITALIST
Payer: MEDICARE

## 2020-03-04 PROBLEM — R09.02 HYPOXIA: Status: ACTIVE | Noted: 2020-03-04

## 2020-03-04 LAB
ANION GAP SERPL CALC-SCNC: 9 MMOL/L (ref 0–11.9)
BASOPHILS # BLD AUTO: 0.4 % (ref 0–1.8)
BASOPHILS # BLD: 0.05 K/UL (ref 0–0.12)
BUN SERPL-MCNC: 14 MG/DL (ref 8–22)
CALCIUM SERPL-MCNC: 8.1 MG/DL (ref 8.5–10.5)
CHLORIDE SERPL-SCNC: 107 MMOL/L (ref 96–112)
CO2 SERPL-SCNC: 22 MMOL/L (ref 20–33)
CREAT SERPL-MCNC: 0.71 MG/DL (ref 0.5–1.4)
EOSINOPHIL # BLD AUTO: 0.11 K/UL (ref 0–0.51)
EOSINOPHIL NFR BLD: 0.8 % (ref 0–6.9)
ERYTHROCYTE [DISTWIDTH] IN BLOOD BY AUTOMATED COUNT: 44.6 FL (ref 35.9–50)
GLUCOSE SERPL-MCNC: 123 MG/DL (ref 65–99)
HCT VFR BLD AUTO: 39.6 % (ref 42–52)
HGB BLD-MCNC: 14.1 G/DL (ref 14–18)
IMM GRANULOCYTES # BLD AUTO: 0.13 K/UL (ref 0–0.11)
IMM GRANULOCYTES NFR BLD AUTO: 0.9 % (ref 0–0.9)
LYMPHOCYTES # BLD AUTO: 0.73 K/UL (ref 1–4.8)
LYMPHOCYTES NFR BLD: 5.2 % (ref 22–41)
MCH RBC QN AUTO: 33.2 PG (ref 27–33)
MCHC RBC AUTO-ENTMCNC: 35.6 G/DL (ref 33.7–35.3)
MCV RBC AUTO: 93.2 FL (ref 81.4–97.8)
MONOCYTES # BLD AUTO: 1.54 K/UL (ref 0–0.85)
MONOCYTES NFR BLD AUTO: 11.1 % (ref 0–13.4)
NEUTROPHILS # BLD AUTO: 11.35 K/UL (ref 1.82–7.42)
NEUTROPHILS NFR BLD: 81.6 % (ref 44–72)
NRBC # BLD AUTO: 0 K/UL
NRBC BLD-RTO: 0 /100 WBC
PLATELET # BLD AUTO: 147 K/UL (ref 164–446)
PMV BLD AUTO: 10.3 FL (ref 9–12.9)
POTASSIUM SERPL-SCNC: 3.3 MMOL/L (ref 3.6–5.5)
RBC # BLD AUTO: 4.25 M/UL (ref 4.7–6.1)
SODIUM SERPL-SCNC: 138 MMOL/L (ref 135–145)
WBC # BLD AUTO: 13.9 K/UL (ref 4.8–10.8)

## 2020-03-04 PROCEDURE — 770006 HCHG ROOM/CARE - MED/SURG/GYN SEMI*

## 2020-03-04 PROCEDURE — A9270 NON-COVERED ITEM OR SERVICE: HCPCS | Performed by: HOSPITALIST

## 2020-03-04 PROCEDURE — 36415 COLL VENOUS BLD VENIPUNCTURE: CPT

## 2020-03-04 PROCEDURE — 99232 SBSQ HOSP IP/OBS MODERATE 35: CPT | Performed by: HOSPITALIST

## 2020-03-04 PROCEDURE — 85025 COMPLETE CBC W/AUTO DIFF WBC: CPT

## 2020-03-04 PROCEDURE — 700102 HCHG RX REV CODE 250 W/ 637 OVERRIDE(OP): Performed by: HOSPITALIST

## 2020-03-04 PROCEDURE — 80048 BASIC METABOLIC PNL TOTAL CA: CPT

## 2020-03-04 PROCEDURE — 97165 OT EVAL LOW COMPLEX 30 MIN: CPT

## 2020-03-04 PROCEDURE — 71046 X-RAY EXAM CHEST 2 VIEWS: CPT

## 2020-03-04 RX ADMIN — FAMOTIDINE 20 MG: 20 TABLET ORAL at 17:13

## 2020-03-04 RX ADMIN — PAROXETINE HYDROCHLORIDE 10 MG: 10 TABLET, FILM COATED ORAL at 05:09

## 2020-03-04 RX ADMIN — FAMOTIDINE 20 MG: 20 TABLET ORAL at 05:09

## 2020-03-04 RX ADMIN — ATORVASTATIN CALCIUM 10 MG: 10 TABLET, FILM COATED ORAL at 17:13

## 2020-03-04 RX ADMIN — Medication 400 MG: at 05:09

## 2020-03-04 RX ADMIN — LATANOPROST 1 DROP: 50 SOLUTION OPHTHALMIC at 20:05

## 2020-03-04 RX ADMIN — POTASSIUM BICARBONATE 50 MEQ: 978 TABLET, EFFERVESCENT ORAL at 09:35

## 2020-03-04 ASSESSMENT — ENCOUNTER SYMPTOMS
COUGH: 0
NAUSEA: 0
NERVOUS/ANXIOUS: 0
CHILLS: 0
DEPRESSION: 0
HALLUCINATIONS: 0
SINUS PAIN: 0
TINGLING: 0
MYALGIAS: 0
SPUTUM PRODUCTION: 0
NECK PAIN: 0
ABDOMINAL PAIN: 1
HEMOPTYSIS: 0
PALPITATIONS: 0
DIZZINESS: 0
CONSTIPATION: 1
HEADACHES: 0

## 2020-03-04 ASSESSMENT — COGNITIVE AND FUNCTIONAL STATUS - GENERAL
DAILY ACTIVITIY SCORE: 23
SUGGESTED CMS G CODE MODIFIER DAILY ACTIVITY: CI
HELP NEEDED FOR BATHING: A LITTLE

## 2020-03-04 ASSESSMENT — ACTIVITIES OF DAILY LIVING (ADL): TOILETING: INDEPENDENT

## 2020-03-04 NOTE — DISCHARGE PLANNING
Anticipated Discharge Disposition: Home with HH     Action: LSW left  for Angela with [facility] to figure out what HH will accept pt's insurance.     Barriers to Discharge: none     Plan: LSW will continue to assess for any discharge needs.

## 2020-03-04 NOTE — PROGRESS NOTES
[facility] Medicine Daily Progress Note    Date of Service  3/4/2020    Chief Complaint  85 y.o. male admitted 2/28/2020 with pancreatitis.     Interval Problem Update  Patient is resting in bed, passing gas, having bm, no abdominal pain, still feels weak, PT/OT today,will probably need HHC, continue requiring 2L of o2 xray ordered.   Advance diet today.       Consultants/Specialty  None    Code Status  Full    Disposition  Home in am.    Review of Systems  Review of Systems   Constitutional: Positive for malaise/fatigue. Negative for chills.   HENT: Negative for nosebleeds and sinus pain.    Respiratory: Negative for cough, hemoptysis and sputum production.    Cardiovascular: Negative for chest pain and palpitations.   Gastrointestinal: Positive for abdominal pain (Improved) and constipation. Negative for nausea.   Genitourinary: Negative for dysuria and urgency.   Musculoskeletal: Negative for myalgias and neck pain.   Neurological: Negative for dizziness, tingling and headaches.   Psychiatric/Behavioral: Negative for depression and hallucinations. The patient is not nervous/anxious.         Physical Exam  Temp:  [36.7 °C (98.1 °F)-37.4 °C (99.3 °F)] 37.3 °C (99.1 °F)  Pulse:  [75-80] 79  Resp:  [16-18] 17  BP: (153-160)/(75-82) 159/82  SpO2:  [92 %-94 %] 93 %    Physical Exam  Vitals signs reviewed.   Constitutional:       General: He is not in acute distress.     Appearance: He is overweight. He is not toxic-appearing or diaphoretic.      Interventions: Nasal cannula in place.   HENT:      Head: Normocephalic.      Nose: No congestion.      Mouth/Throat:      Mouth: Mucous membranes are moist.   Eyes:      General: No scleral icterus.     Extraocular Movements: Extraocular movements intact.      Conjunctiva/sclera: Conjunctivae normal.   Neck:      Musculoskeletal: Normal range of motion. No muscular tenderness.   Cardiovascular:      Rate and Rhythm: Normal rate and regular rhythm.      Pulses: Normal pulses.    Pulmonary:      Effort: Pulmonary effort is normal. No respiratory distress.      Breath sounds: No wheezing.   Abdominal:      General: Bowel sounds are normal. There is no distension.      Palpations: Abdomen is soft.      Tenderness: There is abdominal tenderness (improved). There is no rebound.   Musculoskeletal:         General: No swelling or tenderness.   Skin:     General: Skin is warm and dry.      Capillary Refill: Capillary refill takes less than 2 seconds.      Coloration: Skin is not jaundiced.   Neurological:      General: No focal deficit present.      Mental Status: He is alert and oriented to person, place, and time.   Psychiatric:         Mood and Affect: Mood normal.         Speech: Speech normal.         Behavior: Behavior normal. Behavior is cooperative.         Fluids    Intake/Output Summary (Last 24 hours) at 3/4/2020 1411  Last data filed at 3/4/2020 0900  Gross per 24 hour   Intake 368 ml   Output 1500 ml   Net -1132 ml       Laboratory  Recent Labs     03/02/20  0219 03/03/20  0154 03/04/20  0237   WBC 16.9* 15.4* 13.9*   RBC 4.82 4.60* 4.25*   HEMOGLOBIN 15.7 14.8 14.1   HEMATOCRIT 46.2 43.3 39.6*   MCV 95.9 94.1 93.2   MCH 32.6 32.2 33.2*   MCHC 34.0 34.2 35.6*   RDW 47.9 46.4 44.6   PLATELETCT 134* 144* 147*   MPV 10.0 10.4 10.3     Recent Labs     03/02/20  0219 03/03/20  0154 03/04/20  0237   SODIUM 135 137 138   POTASSIUM 3.4* 3.2* 3.3*   CHLORIDE 104 105 107   CO2 22 25 22   GLUCOSE 140* 118* 123*   BUN 15 15 14   CREATININE 0.83 0.86 0.71   CALCIUM 8.3* 8.4* 8.1*                   Imaging  CT-ABDOMEN-PELVIS WITH   Final Result      Significant inflammatory changes about the pancreatic head and body. Findings compatible with pancreatitis. Inflammation extends above the duodenum as well.      Pancreatic calcifications likely sequelae of chronic pancreatitis.      Atherosclerotic plaque.      Indeterminate 1.1 cm right renal lesion may represent a hemorrhagic/proteinaceous cyst but  "a solid lesion is not excluded. Further evaluation can be performed with renal protocol MRI.      Small hypodense renal lesions bilaterally are too small to characterize.      Bilateral nonobstructing renal calculi.               DX-CHEST-PORTABLE (1 VIEW)   Final Result      Bibasilar opacities may represent atelectasis or pneumonitis.      Atherosclerotic plaque.      DX-CHEST-2 VIEWS    (Results Pending)        Assessment/Plan  * Acute pancreatitis- (present on admission)  Assessment & Plan  Etiology of recurrence is unclear. TAG not elevated, denies drinking x3 years. CT showed evidence of chronic pancreatitis. Lipase elevate at 1991--> 232.   No gallbladder abnormalities on CT  Pain is improving, will advance diet to soft today  Needs to ambulate, passing gas and having bm    Secondary hypertension- (present on admission)  Assessment & Plan  Due to ivf? Stopped ivf today continue monitoring. Might need to add bp medication.    Right renal mass- (present on admission)  Assessment & Plan  Was discussed on admission by admitting hospitalist. CT showed \"Indeterminate 1.1 cm right renal lesion may represent a hemorrhagic/proteinaceous cyst but a solid lesion is not excluded. Further evaluation can be performed with renal protocol MRI. Small hypodense renal lesions bilaterally are too small to characterize\".  Per previous hospitalist patient will follow-up as outpatient    Hypoxia  Assessment & Plan  Xray chest today.   Ambulation with o2 today.     Hypokalemia  Assessment & Plan  Still low, continue replacing along with Mg supplement.     Drug-induced constipation  Assessment & Plan  Continue bowel protocol    Hyperglycemia- (present on admission)  Assessment & Plan  A1c 6%, continue close monitoring probably related to acute pancreatitis  Stable .    Nephrolithiasis- (present on admission)  Assessment & Plan  Noted on CT abdomen/pelvis; nonobstructive.  - IV fluids  - outpatient follow up as needed.  Continue " monitoring       VTE prophylaxis: SCDs    Case and plan of care discussed with nurse staff  Case and plan of care discussed during multidisciplinary rounds

## 2020-03-04 NOTE — DISCHARGE PLANNING
Agency/Facility Name: [facility]  Spoke To: Percy  Outcome: Patient has [employer]. And [facility].

## 2020-03-04 NOTE — CARE PLAN
Problem: Communication  Goal: The ability to communicate needs accurately and effectively will improve  Outcome: PROGRESSING AS EXPECTED  Intervention: Educate patient and significant other/support system about the plan of care, procedures, treatments, medications and allow for questions  Note: Whiteboard updated. Discussed POC with family and patient. Patient and family verbalized understanding     Problem: Pain Management  Goal: Pain level will decrease to patient's comfort goal  Outcome: PROGRESSING AS EXPECTED  Note: Pt currently without acute discomfort. Increased discomfort with deep breathing and IS usage. Declining intervention at this time.

## 2020-03-04 NOTE — FACE TO FACE
Face to Face Supporting Documentation - Home Health    The encounter with this patient was in whole or in part the primary reason for home health admission.    Date of encounter:   Patient:                    MRN:                       YOB: 2020  Mary Castillo  [id number]  9/3/1934     Home health to see patient for:  Skilled Nursing care for assessment, interventions & education    Skilled need for:  Comment: continue PT/OT    Skilled nursing interventions to include:  Comment: continue PT/OT    Homebound status evidenced by:  Needs the assistance of another person in order to leave the home. Leaving home requires a considerable and taxing effort. There is a normal inability to leave the home.    Community Physician to provide follow up care: Rosemary Mckeon M.D.     Optional Interventions? No      I certify the face to face encounter for this home health care referral meets the CMS requirements and the encounter/clinical assessment with the patient was, in whole, or in part, for the medical condition(s) listed above, which is the primary reason for home health care. Based on my clinical findings: the service(s) are medically necessary, support the need for home health care, and the homebound criteria are met.  I certify that this patient has had a face to face encounter by myself.  Isacc Vargas M.D. - NPI: [id number]

## 2020-03-04 NOTE — PROGRESS NOTES
[facility] Medicine Daily Progress Note    Date of Service  3/3/2020    Chief Complaint  85 y.o. male admitted 2/28/2020 with pancreatitis.     Interval Problem Update  Patient is resting in bed, abdominal pain is improved, able to tolerate full liquid diet, no fever no chills, will advance his diet today, patient is to ambulate, continue bowel protocol.    Consultants/Specialty  None    Code Status  Full    Disposition  Home in a day or 2    Review of Systems  Review of Systems   Constitutional: Positive for malaise/fatigue. Negative for fever.   HENT: Negative for congestion, nosebleeds and sinus pain.    Respiratory: Negative for cough, sputum production and shortness of breath.    Cardiovascular: Negative for chest pain and palpitations.   Gastrointestinal: Positive for abdominal pain (Improved) and constipation. Negative for nausea and vomiting.   Genitourinary: Negative for dysuria.   Musculoskeletal: Negative for falls.   Neurological: Negative for dizziness, focal weakness, weakness and headaches.   Psychiatric/Behavioral: Negative for hallucinations and substance abuse. The patient is not nervous/anxious.         Physical Exam  Temp:  [36.7 °C (98.1 °F)-37.2 °C (99 °F)] 36.7 °C (98.1 °F)  Pulse:  [79-91] 79  Resp:  [18] 18  BP: (127-154)/(69-77) 142/74  SpO2:  [92 %-93 %] 92 %    Physical Exam  Vitals signs reviewed.   Constitutional:       General: He is not in acute distress.     Appearance: He is overweight. He is not toxic-appearing or diaphoretic.      Interventions: Nasal cannula in place.   HENT:      Head: Normocephalic.      Nose: No congestion.      Mouth/Throat:      Mouth: Mucous membranes are moist.   Eyes:      General: No scleral icterus.     Extraocular Movements: Extraocular movements intact.   Neck:      Musculoskeletal: Normal range of motion. No muscular tenderness.   Cardiovascular:      Rate and Rhythm: Normal rate and regular rhythm.      Pulses: Normal pulses.   Pulmonary:      Effort:  Pulmonary effort is normal. No respiratory distress.      Breath sounds: No stridor. No wheezing.   Abdominal:      General: Bowel sounds are normal. There is no distension.      Palpations: Abdomen is soft.      Tenderness: There is abdominal tenderness. There is no guarding or rebound.   Musculoskeletal:         General: No swelling or tenderness.   Skin:     General: Skin is warm and dry.      Capillary Refill: Capillary refill takes less than 2 seconds.      Coloration: Skin is not jaundiced.   Neurological:      General: No focal deficit present.      Mental Status: He is alert and oriented to person, place, and time.   Psychiatric:         Mood and Affect: Mood normal.         Speech: Speech normal.         Behavior: Behavior normal. Behavior is cooperative.         Fluids    Intake/Output Summary (Last 24 hours) at 3/3/2020 1620  Last data filed at 3/2/2020 2300  Gross per 24 hour   Intake --   Output 1300 ml   Net -1300 ml       Laboratory  Recent Labs     03/01/20  0241 03/02/20  0219 03/03/20  0154   WBC 16.9* 16.9* 15.4*   RBC 5.01 4.82 4.60*   HEMOGLOBIN 16.2 15.7 14.8   HEMATOCRIT 47.7 46.2 43.3   MCV 95.2 95.9 94.1   MCH 32.3 32.6 32.2   MCHC 34.0 34.0 34.2   RDW 48.6 47.9 46.4   PLATELETCT 145* 134* 144*   MPV 9.9 10.0 10.4     Recent Labs     03/01/20  0241 03/02/20  0219 03/03/20  0154   SODIUM 137 135 137   POTASSIUM 3.6 3.4* 3.2*   CHLORIDE 105 104 105   CO2 25 22 25   GLUCOSE 141* 140* 118*   BUN 16 15 15   CREATININE 0.82 0.83 0.86   CALCIUM 8.0* 8.3* 8.4*                   Imaging  CT-ABDOMEN-PELVIS WITH   Final Result      Significant inflammatory changes about the pancreatic head and body. Findings compatible with pancreatitis. Inflammation extends above the duodenum as well.      Pancreatic calcifications likely sequelae of chronic pancreatitis.      Atherosclerotic plaque.      Indeterminate 1.1 cm right renal lesion may represent a hemorrhagic/proteinaceous cyst but a solid lesion is not  "excluded. Further evaluation can be performed with renal protocol MRI.      Small hypodense renal lesions bilaterally are too small to characterize.      Bilateral nonobstructing renal calculi.               DX-CHEST-PORTABLE (1 VIEW)   Final Result      Bibasilar opacities may represent atelectasis or pneumonitis.      Atherosclerotic plaque.           Assessment/Plan  * Acute pancreatitis- (present on admission)  Assessment & Plan  Etiology of recurrence is unclear. TAG not elevated, denies drinking x3 years. CT showed evidence of chronic pancreatitis. Lipase elevate at 1991--> 232.   No gallbladder abnormalities on CT  Pain is improving, will advance diet to soft today  Needs to ambulate    Secondary hypertension- (present on admission)  Assessment & Plan  Improved, continue monitoring    Right renal mass- (present on admission)  Assessment & Plan  Was discussed on admission by admitting hospitalist. CT showed \"Indeterminate 1.1 cm right renal lesion may represent a hemorrhagic/proteinaceous cyst but a solid lesion is not excluded. Further evaluation can be performed with renal protocol MRI. Small hypodense renal lesions bilaterally are too small to characterize\".  Per previous hospitalist patient will follow-up as outpatient    Hypokalemia  Assessment & Plan  Still low, continue replacing, replacing magnesium due to hypomagnesemia    Drug-induced constipation  Assessment & Plan  Continue bowel protocol    Hyperglycemia- (present on admission)  Assessment & Plan  A1c 6%, continue close monitoring probably related to acute pancreatitis    Nephrolithiasis- (present on admission)  Assessment & Plan  Noted on CT abdomen/pelvis; nonobstructive.  - IV fluids  - outpatient follow up as needed.  Continue monitoring       VTE prophylaxis: SCDs    Case and plan of care discussed with nurse staff  Case and plan of care discussed during multidisciplinary rounds    "

## 2020-03-04 NOTE — PROGRESS NOTES
Assisted patient up out of bed and walked around the entire nursing unit. The patient was off his O2 with spo2 low of 85% exhibiting some mild dyspnea during the exercise. The patient was steady and denied chest pain during the activity. Patient was assisted back to bed and placed back on 2 liters of O2.

## 2020-03-04 NOTE — PROGRESS NOTES
Assumed care of patient at bedside report from NOC RN. Updated on POC. Patient currently A & O x 4, hard of hearing; on 2 L O2 nasal cannula; up standby assist, steady on feet with steady gait; without complaints of acute pain. Call light within reach. Whiteboard updated. Fall precautions in place. Bed locked and in lowest position. All questions answered. No other needs indicated at this time.

## 2020-03-04 NOTE — DISCHARGE PLANNING
Anticipated Discharge Disposition: Home with HH     Action: LSW got choice for HH. LSW faxed over HH choice form to [facility] ext [phone].     Barriers to Discharge: none     Plan: LSW will continue to assess for any discharge needs.

## 2020-03-04 NOTE — DISCHARGE PLANNING
Agency/Facility Name: [facility]  Spoke To: Leonie  Outcome: Referral Accepted.    SANAZ Beckham notified.

## 2020-03-05 ENCOUNTER — PATIENT OUTREACH (OUTPATIENT)
Dept: HEALTH INFORMATION MANAGEMENT | Facility: OTHER | Age: 85
End: 2020-03-05

## 2020-03-05 VITALS
HEIGHT: 71 IN | RESPIRATION RATE: 17 BRPM | HEART RATE: 70 BPM | OXYGEN SATURATION: 93 % | WEIGHT: 193.56 LBS | TEMPERATURE: 99.1 F | SYSTOLIC BLOOD PRESSURE: 112 MMHG | DIASTOLIC BLOOD PRESSURE: 68 MMHG | BODY MASS INDEX: 27.1 KG/M2

## 2020-03-05 PROBLEM — R73.9 HYPERGLYCEMIA: Status: RESOLVED | Noted: 2020-02-28 | Resolved: 2020-03-05

## 2020-03-05 PROBLEM — K59.03 DRUG-INDUCED CONSTIPATION: Status: RESOLVED | Noted: 2020-03-01 | Resolved: 2020-03-05

## 2020-03-05 PROBLEM — I15.9 SECONDARY HYPERTENSION: Status: RESOLVED | Noted: 2020-02-28 | Resolved: 2020-03-05

## 2020-03-05 PROBLEM — E87.6 HYPOKALEMIA: Status: RESOLVED | Noted: 2020-03-02 | Resolved: 2020-03-05

## 2020-03-05 PROBLEM — K85.90 ACUTE PANCREATITIS: Status: RESOLVED | Noted: 2020-02-28 | Resolved: 2020-03-05

## 2020-03-05 LAB — POTASSIUM SERPL-SCNC: 3.3 MMOL/L (ref 3.6–5.5)

## 2020-03-05 PROCEDURE — 700102 HCHG RX REV CODE 250 W/ 637 OVERRIDE(OP): Performed by: HOSPITALIST

## 2020-03-05 PROCEDURE — A9270 NON-COVERED ITEM OR SERVICE: HCPCS | Performed by: HOSPITALIST

## 2020-03-05 PROCEDURE — 84132 ASSAY OF SERUM POTASSIUM: CPT

## 2020-03-05 PROCEDURE — 99239 HOSP IP/OBS DSCHRG MGMT >30: CPT | Performed by: HOSPITALIST

## 2020-03-05 PROCEDURE — 36415 COLL VENOUS BLD VENIPUNCTURE: CPT

## 2020-03-05 PROCEDURE — 700111 HCHG RX REV CODE 636 W/ 250 OVERRIDE (IP): Performed by: HOSPITALIST

## 2020-03-05 RX ORDER — FAMOTIDINE 20 MG/1
20 TABLET, FILM COATED ORAL 2 TIMES DAILY
Qty: 60 TAB | Refills: 0 | Status: SHIPPED | OUTPATIENT
Start: 2020-03-05

## 2020-03-05 RX ORDER — POTASSIUM CHLORIDE 20 MEQ/1
40 TABLET, EXTENDED RELEASE ORAL ONCE
Status: COMPLETED | OUTPATIENT
Start: 2020-03-05 | End: 2020-03-05

## 2020-03-05 RX ADMIN — PAROXETINE HYDROCHLORIDE 10 MG: 10 TABLET, FILM COATED ORAL at 05:03

## 2020-03-05 RX ADMIN — Medication 400 MG: at 05:03

## 2020-03-05 RX ADMIN — POTASSIUM CHLORIDE 40 MEQ: 1500 TABLET, EXTENDED RELEASE ORAL at 09:23

## 2020-03-05 RX ADMIN — HYDROMORPHONE HYDROCHLORIDE 0.5 MG: 1 INJECTION, SOLUTION INTRAMUSCULAR; INTRAVENOUS; SUBCUTANEOUS at 02:10

## 2020-03-05 RX ADMIN — SENNOSIDES AND DOCUSATE SODIUM 2 TABLET: 8.6; 5 TABLET ORAL at 05:03

## 2020-03-05 RX ADMIN — FAMOTIDINE 20 MG: 20 TABLET ORAL at 05:03

## 2020-03-05 NOTE — CARE PLAN
Problem: Safety  Goal: Will remain free from falls  Outcome: PROGRESSING AS EXPECTED  Intervention: Implement fall precautions  Note: Bed alarm in place. Bed in lowest position, treaded socks on, personal belongings and call light within reach, instructed to call for any assistance, hourly rounding in place.      Problem: Discharge Barriers/Planning  Goal: Patient's continuum of care needs will be met  Outcome: PROGRESSING AS EXPECTED  Intervention: Involve patient and significant other/support system in setting and prioritizing goals for hospital stay and discharge  Note: Discussed with MD about care plan. Discharge order obtained. Discharge instructions went over with pt.

## 2020-03-05 NOTE — CARE PLAN
Problem: Communication  Goal: The ability to communicate needs accurately and effectively will improve  Outcome: PROGRESSING AS EXPECTED  Intervention: Educate patient and significant other/support system about the plan of care, procedures, treatments, medications and allow for questions  Flowsheets (Taken 3/5/2020 6973)  Pt & Family Have Been Educated on Methods Available to Report Concerns Related to Care, Treatment, Services, and Patient Safety Issues: Yes  Note: POC discussed with pt, pt verbalized understanding. Pt requesting bedtime eye drops, medicated with scheduled eye drops.      Problem: Discharge Barriers/Planning  Goal: Patient's continuum of care needs will be met  Outcome: PROGRESSING AS EXPECTED  Intervention: Involve patient and significant other/support system in setting and prioritizing goals for hospital stay and discharge  Note: Patient will discharge tomorrow, pt is aware, however pt may need oxygen if unable to wean.

## 2020-03-05 NOTE — PROGRESS NOTES
Pt A/O x4 complaining of pain in abdominal when taking deep breaths but refuses any medication at this time. VS WDL/baseline. Pt has no complaints or concerns at this time. Call light and belongings within reach.

## 2020-03-05 NOTE — DISCHARGE PLANNING
Received Choice form at [phone]  Agency/Facility Name: [facility]  Referral sent per Choice form @ [phone]

## 2020-03-05 NOTE — PROGRESS NOTES
Discharging Patient home per physician order.  Discharged with medications.  Demonstrated understanding of discharge instructions, follow up appointments, home medications, prescriptions.   Ambulating with standy assistance, voiding without difficulty, pain well controlled, tolerating oral medications, oxygen saturation greater than 90% , tolerating diet. IV removed.  Home oxygen delivered. Contact pt's wife.  Educational handouts acute pancreatitis and home oxygen use.   Verbalized understanding of discharge instructions and educational handouts.  All questions answered.  Belongings with patient at time of discharge. Pt left with nurse apprentice and wife

## 2020-03-05 NOTE — DISCHARGE PLANNING
Anticipated Discharge Disposition: Home with HH     Action: LSW got choice for O2. LSW will fax choice form to [facility] ext [phone].     Barriers to Discharge: none     Plan: LSW will continue to assess for any discharge needs.

## 2020-03-05 NOTE — DISCHARGE INSTRUCTIONS
Discharge Instructions per Isacc Vargas M.D.    Continue working with incentive spirometry  Follow up with primary care doctor in 1 week      DIET: soft diet    ACTIVITY: as tolerated    DIAGNOSIS: pancreatitis.     Return to ER if symptoms return, fever, abdominal pain, uncontrol nausea and vomiting.                 Discharge Instructions    Discharged to home by car with relative. Discharged via wheelchair, hospital escort: Yes.  Special equipment needed: Oxygen    Be sure to schedule a follow-up appointment with your primary care doctor or any specialists as instructed.     Discharge Plan:   Diet Plan: (P) Discussed  Activity Level: (P) Discussed  Confirmed Follow up Appointment: (P) Patient to Call and Schedule Appointment  Confirmed Symptoms Management: (P) Discussed  Medication Reconciliation Updated: (P) Yes  Influenza Vaccine Indication: (P) Patient Refuses    I understand that a diet low in cholesterol, fat, and sodium is recommended for good health. Unless I have been given specific instructions below for another diet, I accept this instruction as my diet prescription.   Other diet:Low Fiber GI Soft    Special Instructions: None    · Is patient discharged on Warfarin / Coumadin?   No     Oxygen Use at Home  Oxygen can be prescribed for home use. The prescription will show the flow rate. This is how much oxygen is to be used per minute. This will be listed in liters per minute (LPM or L/M). A liter is a metric measurement of volume.  You will use oxygen therapy as directed. It can be used while exercising, sleeping, or at rest. You may need oxygen continuously. Your health care provider may order a blood oxygen test (arterial blood gas or pulse oximetry test) that will show what your oxygen level is. Your health care provider will use these measurements to learn about your needs and follow your progress.  Home oxygen therapy is commonly used on patients with various lung (pulmonary) related  conditions. Some of these conditions include:  · Asthma.  · Lung cancer.  · Pneumonia.  · Emphysema.  · Chronic bronchitis.  · Cystic fibrosis.  · Other lung diseases.  · Pulmonary fibrosis.  · Occupational lung disease.  · Heart failure.  · Chronic obstructive pulmonary disease (COPD).  3 COMMON WAYS OF PROVIDING OXYGEN THERAPY  · Gas: The gas form of oxygen is put into variously sized cylinders or tanks. The cylinders or oxygen tanks contain compressed oxygen. The cylinder is equipped with a regulator that controls the flow rate. Because the flow of oxygen out of the cylinder is constant, an oxygen conserving device may be attached to the system to avoid waste. This device releases the gas only when you inhale and cuts it off when you exhale. Oxygen can be provided in a small cylinder that can be carried with you. Large tanks are heavy and are only for stationary use. After use, empty tanks must be exchanged for full tanks.  · Liquid: The liquid form of oxygen is put into a container similar to a thermos. When released, the liquid converts to a gas and you breathe it in just like the compressed gas. This storage method takes up less space than the compressed gas cylinder, and you can transfer the liquid to a small, portable vessel at home. Liquid oxygen is more expensive than the compressed gas, and the vessel vents when not in use. An oxygen conserving device may be built into the vessel to conserve the oxygen. Liquid oxygen is very cold, around 297° below zero.  · Oxygen concentrator: This medical device filters oxygen from room air and gives almost 100% oxygen to the patient. Oxygen concentrators are powered by electricity. Benefits of this system are:  ¨ It does not need to be resupplied.  ¨ It is not as costly as liquid oxygen.  ¨ Extra tubing permits the user to move around easier.  There are several types of small, portable oxygen systems available which can help you remain active and mobile. You must have  "a cylinder of oxygen as a backup in the event of a power failure. Advise your electric power company that you are on oxygen therapy in order to get priority service when there is a power failure.  OXYGEN DELIVERY DEVICES  There are 3 common ways to deliver oxygen to your body.  · Nasal cannula. This is a 2-pronged device inserted in the nostrils that is connected to tubing carrying the oxygen. The tubing can rest on the ears or be attached to the frame of eyeglasses.  · Mask. People who need a high flow of oxygen generally use a mask.  · Transtracheal catheter. Transtracheal oxygen therapy requires the insertion of a small, flexible tube (catheter) in the windpipe (trachea). This catheter is held in place by a necklace. Since transtracheal oxygen bypasses the mouth, nose, and throat, a humidifier is absolutely required at flow rates of 1 LPM or greater.  OXYGEN USE SAFETY TIPS  · Never smoke while using oxygen. Oxygen does not burn or explode, but flammable materials will burn faster in the presence of oxygen.  · Keep a fire extinguisher close by. Let your fire department know that you have oxygen in your home.  · Warn visitors not to smoke near you when you are using oxygen. Put up \"no smoking\" signs in your home where you most often use the oxygen.  · When you go to a restaurant with your portable oxygen source, ask to be seated in the nonsmoking section.  · Stay at least 5 feet away from gas stoves, candles, lighted fireplaces, or other heat sources.  · Do not use materials that burn easily (flammable) while using your oxygen.  · If you use an oxygen cylinder, make sure it is secured to some fixed object or in a stand. If you use liquid oxygen, make sure the vessel is kept upright to keep the oxygen from pouring out. Liquid oxygen is so cold it can hurt your skin.  · If you use an oxygen concentrator, call your electric company so you will be given priority service if your power goes out. Avoid using extension " cords, if possible.  · Regularly test your smoke detectors at home to make sure they work. If you receive care in your home from a nurse or other health care provider, he or she may also check to make sure your smoke detectors work.  GUIDELINES FOR CLEANING YOUR EQUIPMENT  · Wash the nasal prongs with a liquid soap. Thoroughly rinse them once or twice a week.  · Replace the prongs every 2 to 4 weeks. If you have an infection (cold, pneumonia) change them when you are well.  · Your health care provider will give you instructions on how to clean your transtracheal catheter.  · The humidifier bottle should be washed with soap and warm water and rinsed thoroughly between each refill. Air-dry the bottle before filling it with sterile or distilled water. The bottle and its top should be disinfected after they are cleaned.  · If you use an oxygen concentrator, unplug the unit. Then wipe down the cabinet with a damp cloth and dry it daily. The air filter should be cleaned at least twice a week.  · Follow your home medical equipment and service company's directions for cleaning the compressor filter.  HOME CARE INSTRUCTIONS   · Do not change the flow of oxygen unless directed by your health care provider.  · Do not use alcohol or other sedating drugs unless instructed. They slow your breathing rate.  · Do not use materials that burn easily (flammable) while using your oxygen.  · Always keep a spare tank of oxygen. Plan ahead for holidays when you may not be able to get a prescription filled.  · Use water-based lubricants on your lips or nostrils. Do not use an oil-based product like petroleum jelly.  · To prevent your cheeks or the skin behind your ears from becoming irritated, tuck some gauze under the tubing.  · If you have persistent redness under your nose, call your health care provider.  · When you no longer need oxygen, your doctor will have the oxygen discontinued. Oxygen is not addicting or habit forming.  · Use the  oxygen as instructed. Too much oxygen can be harmful and too little will not give you the benefit you need.  · Shortness of breath is not always from a lack of oxygen. If your oxygen level is not the cause of your shortness of breath, taking oxygen will not help.  SEEK MEDICAL CARE IF:   · You have frequent headaches.  · You have shortness of breath or a lasting cough.  · You have anxiety.  · You are confused.  · You are drowsy or sleepy all the time.  · You develop an illness which aggravates your breathing.  · You cannot exercise.  · You are restless.  · You have blue lips or fingernails.  · You have difficult or irregular breathing and it is getting worse.  · You have a fever.     This information is not intended to replace advice given to you by your health care provider. Make sure you discuss any questions you have with your health care provider.     Document Released: 03/09/2005 Document Revised: 01/08/2016 Document Reviewed: 07/30/2014  [employer] Interactive Patient Education ©2016 [employer]      Acute Pancreatitis  Acute pancreatitis is a condition in which the pancreas suddenly becomes irritated and swollen (has inflammation). The pancreas is a gland that is located behind the stomach. It produces enzymes that help to digest food. The pancreas also releases the hormones glucagon and insulin, which help to regulate blood sugar. Damage to the pancreas occurs when the digestive enzymes from the pancreas are activated before they are released into the intestine.  Most acute attacks last a couple of days and can cause serious problems. Some people become dehydrated and develop low blood pressure. In severe cases, bleeding into the pancreas can lead to shock and can be life-threatening. The lungs, heart, and kidneys may fail.  What are the causes?  The most common causes of this condition are:  · Alcohol abuse.  · Gallstones.  Other causes include:  · Certain medicines.  · Exposure to certain  chemicals.  · Infection.  · Damage caused by an accident (trauma).  · Abdominal surgery.  In some cases, the cause may not be known.  What are the signs or symptoms?  Symptoms of this condition include:  · Pain in the upper abdomen that may radiate to the back.  · Tenderness and swelling of the abdomen.  · Nausea and vomiting.  How is this diagnosed?  This condition may be diagnosed based on:  · A physical exam.  · Blood tests.  · Imaging tests, such as X-rays, CT scans, or an ultrasound of the abdomen.  How is this treated?  Treatment for this condition usually requires a stay in the hospital. Treatment may include:  · Pain medicine.  · Fluid replacement through an IV tube.  · Placing a tube in the stomach to remove stomach contents and to control vomiting (NG tube, or nasogastric tube).  · Not eating for 3-4 days. This gives the pancreas a rest, because enzymes are not being produced that can cause further damage.  · Antibiotic medicines, if your condition is caused by an infection.  · Surgery on the pancreas or gallbladder.  Follow these instructions at home:  Eating and drinking  · Follow instructions from your health care provider about diet. This may involve avoiding alcohol and decreasing the amount of fat in your diet.  · Eat smaller, more frequent meals. This reduces the amount of digestive fluids that the pancreas produces.  · Drink enough fluid to keep your urine clear or pale yellow.  · Do not drink alcohol if it caused your condition.  General instructions  · Take over-the-counter and prescription medicines only as told by your health care provider.  · Do not use any tobacco products, such as cigarettes, chewing tobacco, and e-cigarettes. If you need help quitting, ask your health care provider.  · Get plenty of rest.  · If directed, check your blood sugar at home as told by your health care provider.  · Keep all follow-up visits as told by your health care provider. This is important.  Contact a health  care provider if:  · You do not recover as quickly as expected.  · You develop new or worsening symptoms.  · You have persistent pain, weakness, or nausea.  · You recover and then have another episode of pain.  · You have a fever.  Get help right away if:  · You cannot eat or keep fluids down.  · Your pain becomes severe.  · Your skin or the white part of your eyes turns yellow (jaundice).  · You vomit.  · You feel dizzy or you faint.  · Your blood sugar is high (over 300 mg/dL).  This information is not intended to replace advice given to you by your health care provider. Make sure you discuss any questions you have with your health care provider.  Document Released: 12/18/2006 Document Revised: 04/26/2017 Document Reviewed: 09/20/2016  [employer] Interactive Patient Education © 2017 [employer]      Depression / Suicide Risk    As you are discharged from this [facility] facility, it is important to learn how to keep safe from harming yourself.    Recognize the warning signs:  · Abrupt changes in personality, positive or negative- including increase in energy   · Giving away possessions  · Change in eating patterns- significant weight changes-  positive or negative  · Change in sleeping patterns- unable to sleep or sleeping all the time   · Unwillingness or inability to communicate  · Depression  · Unusual sadness, discouragement and loneliness  · Talk of wanting to die  · Neglect of personal appearance   · Rebelliousness- reckless behavior  · Withdrawal from people/activities they love  · Confusion- inability to concentrate     If you or a loved one observes any of these behaviors or has concerns about self-harm, here's what you can do:  · Talk about it- your feelings and reasons for harming yourself  · Remove any means that you might use to hurt yourself (examples: pills, rope, extension cords, firearm)  · Get professional help from the community (Mental Health, Substance Abuse, psychological counseling)  · Do  not be alone:Call your Safe Contact- someone whom you trust who will be there for you.  · Call your local CRISIS HOTLINE 706-8137 or 296-943-3770  · Call your local Children's Mobile Crisis Response Team [place] (609) 424-0790 or www.BestTravelWebsites  · Call the toll free National Suicide Prevention Hotlines   · National Suicide Prevention Lifeline 346-000-CZRD (0798)  · National [employer] Line Network 800-SUICIDE (947-4397)

## 2020-03-05 NOTE — DISCHARGE PLANNING
Agency/Facility Name: [facility]  Spoke To: Cristina  Outcome: Referral accepted.  Oxygen will be delivered bedside shortly.    SANAZ Beckham notified.

## 2020-03-05 NOTE — PROGRESS NOTES
Pt A/O x 4 C/O of abdominal pain, medicated with PRN dilaudid IV per MAR. Offered PO pain medication, pt refused PO at this time. POC discussed with pt, pt verbalized understanding. Pt on 2.5L of oxygen, tolerating well >90% oxygen saturation. All needs met at this time. Bed in lowest position and locked. Instructed pt to call for assistance if needed. Call light and belongings within reach.

## 2020-03-05 NOTE — FACE TO FACE
"Face to Face Note  -  Durable Medical Equipment    Isacc Vargas M.D. - NPI: [id number]  I certify that this patient is under my care and that they had a durable medical equipment(DME)face to face encounter by myself that meets the physician DME face-to-face encounter requirements with this patient on:    Date of encounter:   Patient:                    MRN:                       YOB: 2020  Mary Castillo  [id number]  9/3/1934     The encounter with the patient was in whole, or in part, for the following medical condition, which is the primary reason for durable medical equipment:  Other - hypoxia    I certify that, based on my findings, the following durable medical equipment is medically necessary:  Oxygen.    HOME O2 Saturation Measurements:(Values must be present for Home Oxygen orders)  Room air sat at rest: 91  Room air sat with amb: 85  With liters of O2: 3, O2 sat at rest with O2: 94  With Liters of O2: 3, O2 sat with amb with O2 : 92  Is the patient mobile?: Yes    My Clinical findings support the need for the above equipment due to:  Hypoxia    Supporting Symptoms: The patient requires supplemental oxygen, as the following interventions have been tried with limited or no improvement: \"Positive expiratory pressure therapies    "

## 2020-03-05 NOTE — THERAPY
"Occupational Therapy Evaluation completed.   Functional Status:  Pt presents to skilled OT services following acute pancreatitis. Pt performed bed mobility with supervision, LB dressing, toileting and toilet t/f with supervision. Pt ambulated short distance within room and hallway with supervision/sba, slightly unsteady, declined use of FWW despite acknowledging his balance is decreased from baseline, odd affect, very fixated on putting SCD's back on throughout the session. Per pt, spouse is in good health and able to assist at home upon d/c with IADLs. Pt spO2 on RA at rest 95%, post activity 88% and w/ placement of supplemental O2 recovered back to 97%. Pt doesn't present the need for acute O2 services, will benefit from HH OT services and assist from spouse with IADLs.   Plan of Care: Patient with no further skilled OT needs in the acute care setting at this time  Discharge Recommendations:  Equipment: No Equipment Needed. Post-acute therapy OT evaluation completed. Patient is currently not being actively followed for occupational therapy services at this time. However, pt may be seen at the request of attending provider for an additional visit to address any discharge or equipment needs within 30 days from evaluation.      See \"Rehab Therapy-Acute\" Patient Summary Report for complete documentation.    "

## 2020-03-06 NOTE — DISCHARGE SUMMARY
Discharge Summary    CHIEF COMPLAINT ON ADMISSION  Chief Complaint   Patient presents with   • Abdominal Pain     started at 1 am, started epigastric and now spreading through abd, N/V reported, sharp pain, sudden onset.       Reason for Admission  Abd Pain     Admission Date  2/28/2020    CODE STATUS  Full code    HPI & HOSPITAL COURSE  Please see original H&P for specific information, patient was admitted due to abdominal pain patient was found to have acute pancreatitis, patient has history of acute pancreatitis 3 years ago after drinking too many beers he denies any recent alcohol intake CT abdomen showed significant inflammatory changes on pancreatic head and body, pancreatic calcifications likely is due to chronic pancreatitis, there is a right renal lesion probably cyst but a solid lesion is not excluded recommend further evaluation with MRI with renal protocol, this was discussed with patient and he knows that he needs to follow-up as outpatient with his primary care physician, patient was started on supportive treatment for his acute pancreatitis, on CT abdomen did not show any liver or gallbladder abnormalities, patient responded well to treatment, gradually improved he was able to tolerate diet, patient continued requiring 2.5 L of oxygen patient had ambulation test and he has improved requiring oxygen only with ambulation, chest x-ray showed atelectasis patient is recommended to continue on incentive spirometry at home, patient is able to ambulate, PT OT evaluated patient recommending a home with home health care, this has been arranged as well as oxygen at home, patient had hypertension likely related to IV fluids blood pressure improved after IV fluids were stopped, patient today he is alert oriented follows commands, he is having bowel movements passing gas he is tolerating diet he is afebrile patient had leukocytosis likely related to acute pancreatitis this is trending down he is not having fevers  anymore, patient is going to be discharged home today continue with soft diet for next few days, he will need to follow-up with primary care physician in 1 week needs to follow-up with incidental findings on CT, patient expressed understanding of his discharge plan and agree with it all questions answered.       Therefore, he is discharged in good and stable condition to home with organized home healthcare and close outpatient follow-up.    The patient met 2-midnight criteria for an inpatient stay at the time of discharge.    Discharge Date  3/5/2020    FOLLOW UP ITEMS POST DISCHARGE  Primary care physician in 1 week  Needs follow-up with renal MRI (primary care physician)    DISCHARGE DIAGNOSES  Principal Problem (Resolved):    Acute pancreatitis POA: Yes  Active Problems:    Right renal mass POA: Yes    Hypoxia POA: Unknown    Nephrolithiasis POA: Yes  Resolved Problems:    Secondary hypertension POA: Yes    Hyperglycemia POA: Yes    Drug-induced constipation POA: Clinically Undetermined    Hypokalemia POA: No      FOLLOW UP  No future appointments.  Rosemary Mckeon M.D.  [address]  [facility] [id number] [phone]    In 1 week  Please call to schedule your hospital follow up. Thank you       MEDICATIONS ON DISCHARGE     Medication List      START taking these medications      Instructions   famotidine 20 MG Tabs  Commonly known as:  PEPCID   Take 1 Tab by mouth 2 Times a Day.  Dose:  20 mg     magnesium oxide 400 MG Tabs tablet  Start taking on:  March 6, 2020  Commonly known as:  MAG-OX   Take 1 Tab by mouth every day for 14 days.  Dose:  400 mg        CONTINUE taking these medications      Instructions   atorvastatin 10 MG Tabs  Commonly known as:  LIPITOR   Take 10 mg by mouth every evening.  Dose:  10 mg     Lumigan 0.01 % Soln  Generic drug:  bimatoprost   1 Drop by Ophthalmic route every bedtime. To both eyes  Dose:  1 Drop     PARoxetine 10 MG Tabs  Commonly known as:  PAXIL   Take 10 mg  by mouth every day.  Dose:  10 mg            Allergies  Allergies   Allergen Reactions   • Hydrodiuril [Hctz] Itching   • Lidocaine    • Sulfa Drugs        DIET  Healthy diet    ACTIVITY  As tolerated.  Weight bearing as tolerated    CONSULTATIONS  None    PROCEDURES  None    LABORATORY  Lab Results   Component Value Date    SODIUM 138 03/04/2020    POTASSIUM 3.3 (L) 03/05/2020    CHLORIDE 107 03/04/2020    CO2 22 03/04/2020    GLUCOSE 123 (H) 03/04/2020    BUN 14 03/04/2020    CREATININE 0.71 03/04/2020        Lab Results   Component Value Date    WBC 13.9 (H) 03/04/2020    HEMOGLOBIN 14.1 03/04/2020    HEMATOCRIT 39.6 (L) 03/04/2020    PLATELETCT 147 (L) 03/04/2020        Total time of the discharge process exceeds 40 minutes.  Hypokalemia was replaced before discharge, patient is tolerating diet he is also on magnesium supplements.

## 2021-01-14 DIAGNOSIS — Z23 NEED FOR VACCINATION: ICD-10-CM
